# Patient Record
Sex: FEMALE | Race: WHITE | NOT HISPANIC OR LATINO | URBAN - METROPOLITAN AREA
[De-identification: names, ages, dates, MRNs, and addresses within clinical notes are randomized per-mention and may not be internally consistent; named-entity substitution may affect disease eponyms.]

---

## 2017-03-27 ENCOUNTER — APPOINTMENT (RX ONLY)
Dept: URBAN - METROPOLITAN AREA CLINIC 5 | Facility: CLINIC | Age: 27
Setting detail: DERMATOLOGY
End: 2017-03-27

## 2017-03-27 DIAGNOSIS — Z41.9 ENCOUNTER FOR PROCEDURE FOR PURPOSES OTHER THAN REMEDYING HEALTH STATE, UNSPECIFIED: ICD-10-CM

## 2017-03-27 PROCEDURE — ? CHEMICAL PEEL

## 2017-03-27 ASSESSMENT — LOCATION SIMPLE DESCRIPTION DERM: LOCATION SIMPLE: LEFT CHEEK

## 2017-03-27 ASSESSMENT — LOCATION DETAILED DESCRIPTION DERM: LOCATION DETAILED: LEFT INFERIOR CENTRAL MALAR CHEEK

## 2017-03-27 ASSESSMENT — LOCATION ZONE DERM: LOCATION ZONE: FACE

## 2017-03-27 NOTE — PROCEDURE: CHEMICAL PEEL
Consent: Prior to the procedure, written consent was obtained and risks were reviewed, including but not limited to: redness, peeling, blistering, pigmentary change, scarring, infection, and pain.
Detail Level: Zone
Treatment Number: 3
Chemical Peel: Trudy
Prep: The treated area was degreased with pre-peel cleanser, and vaseline was applied for protection of mucous membranes.
Post Peel Care: After the procedure, a post-peel cream was applied to the treated areas. Sun protection and post-care instructions were reviewed with the patient.
Post-Care Instructions: I reviewed with the patient in detail post-care instructions. Patient should avoid sun exposure and wear sun protection.

## 2017-04-03 ENCOUNTER — APPOINTMENT (RX ONLY)
Dept: URBAN - METROPOLITAN AREA CLINIC 29 | Facility: CLINIC | Age: 27
Setting detail: DERMATOLOGY
End: 2017-04-03

## 2017-04-03 DIAGNOSIS — Z41.9 ENCOUNTER FOR PROCEDURE FOR PURPOSES OTHER THAN REMEDYING HEALTH STATE, UNSPECIFIED: ICD-10-CM

## 2017-04-03 PROCEDURE — ? CHEMICAL PEEL

## 2017-04-03 ASSESSMENT — LOCATION ZONE DERM: LOCATION ZONE: FACE

## 2017-04-03 ASSESSMENT — LOCATION SIMPLE DESCRIPTION DERM: LOCATION SIMPLE: LEFT CHEEK

## 2017-04-03 ASSESSMENT — LOCATION DETAILED DESCRIPTION DERM: LOCATION DETAILED: LEFT CENTRAL MALAR CHEEK

## 2017-04-03 NOTE — PROCEDURE: CHEMICAL PEEL
Consent: Prior to the procedure, written consent was obtained and risks were reviewed, including but not limited to: redness, peeling, blistering, pigmentary change, scarring, infection, and pain.
Detail Level: Zone
Post-Care Instructions: I reviewed with the patient in detail post-care instructions. Patient should avoid sun exposure and wear sun protection.
Post Peel Care: After the procedure, a post-peel cream was applied to the treated areas. Sun protection and post-care instructions were reviewed with the patient.
Prep: The treated area was degreased with pre-peel cleanser, and vaseline was applied for protection of mucous membranes.
Treatment Number: 0
Chemical Peel: Trudy

## 2017-08-22 ENCOUNTER — APPOINTMENT (RX ONLY)
Dept: URBAN - METROPOLITAN AREA CLINIC 5 | Facility: CLINIC | Age: 27
Setting detail: DERMATOLOGY
End: 2017-08-22

## 2017-08-22 DIAGNOSIS — L90.5 SCAR CONDITIONS AND FIBROSIS OF SKIN: ICD-10-CM

## 2017-08-22 DIAGNOSIS — L81.0 POSTINFLAMMATORY HYPERPIGMENTATION: ICD-10-CM

## 2017-08-22 DIAGNOSIS — L70.8 OTHER ACNE: ICD-10-CM | Status: INADEQUATELY CONTROLLED

## 2017-08-22 PROCEDURE — 99213 OFFICE O/P EST LOW 20 MIN: CPT

## 2017-08-22 PROCEDURE — ? COUNSELING

## 2017-08-22 PROCEDURE — ? TREATMENT REGIMEN

## 2017-08-22 PROCEDURE — ? OTHER

## 2017-08-22 PROCEDURE — ? PRESCRIPTION

## 2017-08-22 RX ORDER — DAPSONE 75 MG/G
1 GEL TOPICAL QD
Qty: 1 | Refills: 2 | Status: ERX | COMMUNITY
Start: 2017-08-22

## 2017-08-22 RX ORDER — DAPSONE 50 MG/G
GEL TOPICAL QD
Qty: 1 | Refills: 4 | Status: ERX

## 2017-08-22 RX ORDER — ADAPALENE AND BENZOYL PEROXIDE 3; 25 MG/G; MG/G
GEL TOPICAL QHS
Qty: 1 | Refills: 4 | Status: ERX

## 2017-08-22 RX ORDER — DOXYCYCLINE HYCLATE 100 MG/1
1 CAPSULE, GELATIN COATED ORAL BID
Qty: 60 | Refills: 1 | Status: ERX | COMMUNITY
Start: 2017-08-22

## 2017-08-22 RX ADMIN — DAPSONE 1: 75 GEL TOPICAL at 00:00

## 2017-08-22 RX ADMIN — DOXYCYCLINE HYCLATE 1: 100 CAPSULE, GELATIN COATED ORAL at 22:33

## 2017-08-22 ASSESSMENT — LOCATION SIMPLE DESCRIPTION DERM
LOCATION SIMPLE: RIGHT CHEEK
LOCATION SIMPLE: LEFT CHEEK

## 2017-08-22 ASSESSMENT — LOCATION ZONE DERM: LOCATION ZONE: FACE

## 2017-08-22 ASSESSMENT — LOCATION DETAILED DESCRIPTION DERM
LOCATION DETAILED: LEFT INFERIOR CENTRAL MALAR CHEEK
LOCATION DETAILED: RIGHT INFERIOR CENTRAL MALAR CHEEK
LOCATION DETAILED: LEFT SUPERIOR MEDIAL BUCCAL CHEEK
LOCATION DETAILED: RIGHT SUPERIOR MEDIAL BUCCAL CHEEK

## 2017-08-22 NOTE — PROCEDURE: TREATMENT REGIMEN
Detail Level: Zone
Plan: Advised Patient to discuss starting spironolactone with ob/gyn
Continue Regimen: Epiduo forte nightly \\nAczone every morning
Initiate Treatment: Probiotic supplement daily \\nDoxycycline 100mg twice a day for 2-4 weeks, then decrease to once a day for 2-4 weeks \\nGoal doxy 50mg daily

## 2017-08-22 NOTE — PROCEDURE: MIPS QUALITY
Quality 431: Preventive Care And Screening: Unhealthy Alcohol Use - Screening: Patient screened for unhealthy alcohol use using a single question and scores 2 or greater episodes per year and brief intervention occurred
Detail Level: Simple

## 2017-08-22 NOTE — PROCEDURE: OTHER
Detail Level: Zone
Note Text (......Xxx Chief Complaint.): This diagnosis correlates with the
Other (Free Text): Family history of breast cancer (mother) \\nDenies HTN, DM

## 2018-02-12 ENCOUNTER — APPOINTMENT (RX ONLY)
Dept: URBAN - METROPOLITAN AREA CLINIC 5 | Facility: CLINIC | Age: 28
Setting detail: DERMATOLOGY
End: 2018-02-12

## 2018-02-12 DIAGNOSIS — Z41.9 ENCOUNTER FOR PROCEDURE FOR PURPOSES OTHER THAN REMEDYING HEALTH STATE, UNSPECIFIED: ICD-10-CM

## 2018-02-12 PROCEDURE — ? DEFER

## 2018-02-12 ASSESSMENT — LOCATION SIMPLE DESCRIPTION DERM: LOCATION SIMPLE: LEFT CHEEK

## 2018-02-12 ASSESSMENT — LOCATION ZONE DERM: LOCATION ZONE: FACE

## 2018-02-12 ASSESSMENT — LOCATION DETAILED DESCRIPTION DERM: LOCATION DETAILED: LEFT INFERIOR CENTRAL MALAR CHEEK

## 2018-02-26 ENCOUNTER — APPOINTMENT (RX ONLY)
Dept: URBAN - METROPOLITAN AREA CLINIC 5 | Facility: CLINIC | Age: 28
Setting detail: DERMATOLOGY
End: 2018-02-26

## 2018-02-26 DIAGNOSIS — Z41.9 ENCOUNTER FOR PROCEDURE FOR PURPOSES OTHER THAN REMEDYING HEALTH STATE, UNSPECIFIED: ICD-10-CM

## 2018-02-26 PROCEDURE — ? MICRONEEDLING

## 2018-02-26 NOTE — PROCEDURE: MICRONEEDLING
Location #2: cheeks
Consent: Written consent obtained, risks reviewed including but not limited to pain, scarring, infection and incomplete improvement.  Patient understands the procedure is cosmetic in nature and will require out of pocket payment.
Location #3: chin lip
Depth In Mm (Location #2): 1.7
Post-Care Instructions: After the procedure, take precautions agains sun exposure. Do not apply sunscreen for 12 hours after the procedure. Do not apply make-up for 12 hours after the procedure. Avoid alcohol based toners for 10-14 days. After 2-3 days patients can return to their regular skin regimen.
Depth In Mm (Location #4): 0.6
Treatment Number (Optional): 1
Location #1: forehead
Depth In Mm (Location #1): 1.5
Infusions (Optional): hyaluronic acid
Detail Level: Zone

## 2018-03-20 ENCOUNTER — APPOINTMENT (RX ONLY)
Dept: URBAN - METROPOLITAN AREA CLINIC 5 | Facility: CLINIC | Age: 28
Setting detail: DERMATOLOGY
End: 2018-03-20

## 2019-02-19 ENCOUNTER — APPOINTMENT (RX ONLY)
Dept: URBAN - METROPOLITAN AREA CLINIC 89 | Facility: CLINIC | Age: 29
Setting detail: DERMATOLOGY
End: 2019-02-19

## 2019-02-19 DIAGNOSIS — L70.8 OTHER ACNE: ICD-10-CM

## 2019-02-19 PROBLEM — L70.0 ACNE VULGARIS: Status: ACTIVE | Noted: 2019-02-19

## 2019-02-19 PROCEDURE — ? TREATMENT REGIMEN

## 2019-02-19 PROCEDURE — ? PRESCRIPTION

## 2019-02-19 PROCEDURE — 99203 OFFICE O/P NEW LOW 30 MIN: CPT

## 2019-02-19 PROCEDURE — ? COUNSELING

## 2019-02-19 RX ORDER — ADAPALENE AND BENZOYL PEROXIDE 3; 25 MG/G; MG/G
GEL TOPICAL QHS
Qty: 1 | Refills: 2 | Status: ERX | COMMUNITY
Start: 2019-02-19

## 2019-02-19 RX ORDER — SPIRONOLACTONE 50 MG/1
TABLET, FILM COATED ORAL QD
Qty: 30 | Refills: 1 | Status: ERX | COMMUNITY
Start: 2019-02-19

## 2019-02-19 RX ADMIN — ADAPALENE AND BENZOYL PEROXIDE: 3; 25 GEL TOPICAL at 00:00

## 2019-02-19 RX ADMIN — SPIRONOLACTONE: 50 TABLET, FILM COATED ORAL at 00:00

## 2019-02-19 ASSESSMENT — LOCATION DETAILED DESCRIPTION DERM: LOCATION DETAILED: LEFT MEDIAL FOREHEAD

## 2019-02-19 ASSESSMENT — LOCATION SIMPLE DESCRIPTION DERM: LOCATION SIMPLE: LEFT FOREHEAD

## 2019-02-19 ASSESSMENT — LOCATION ZONE DERM: LOCATION ZONE: FACE

## 2019-02-19 NOTE — PROCEDURE: TREATMENT REGIMEN
Otc Regimen: Gentle cleansers/moisturizers (Cerave/Cetaphil)
Plan: Pt denies any PHx of kidney problems\\nPt to notify office if she experienced any mood changes while in Spironolactone. Recommend patient to eat and drink well to avoid light headedness or dizziness
Detail Level: Zone
Continue Regimen: Clindamycin/benzoyl peroxide - aaa face QAM (prescribed by PCP)
Initiate Treatment: Spironolactone 50 mg - take one tablet by mouth once daily\\nEpiduo Forte - Apply pea sized amount to face every night to every other night as tolerated (Texan)

## 2019-03-26 ENCOUNTER — APPOINTMENT (RX ONLY)
Dept: URBAN - METROPOLITAN AREA CLINIC 89 | Facility: CLINIC | Age: 29
Setting detail: DERMATOLOGY
End: 2019-03-26

## 2019-03-26 DIAGNOSIS — L70.8 OTHER ACNE: ICD-10-CM | Status: INADEQUATELY CONTROLLED

## 2019-03-26 PROCEDURE — ? PRESCRIPTION

## 2019-03-26 PROCEDURE — 99214 OFFICE O/P EST MOD 30 MIN: CPT

## 2019-03-26 PROCEDURE — ? TREATMENT REGIMEN

## 2019-03-26 PROCEDURE — ? COUNSELING

## 2019-03-26 RX ORDER — SPIRONOLACTONE 100 MG/1
1 TABLET, FILM COATED ORAL QD
Qty: 90 | Refills: 0 | Status: ERX | COMMUNITY
Start: 2019-03-26

## 2019-03-26 RX ADMIN — SPIRONOLACTONE 1: 100 TABLET, FILM COATED ORAL at 21:00

## 2019-03-26 ASSESSMENT — LOCATION SIMPLE DESCRIPTION DERM: LOCATION SIMPLE: LEFT FOREHEAD

## 2019-03-26 ASSESSMENT — SEVERITY ASSESSMENT OVERALL AMONG ALL PATIENTS
IN YOUR EXPERIENCE, AMONG ALL PATIENTS YOU HAVE SEEN WITH THIS CONDITION, HOW SEVERE IS THIS PATIENT'S CONDITION?: MULTIPLE INFLAMMATORY LESIONS BUT NONINFLAMMATORY LESIONS PREDOMINATE

## 2019-03-26 ASSESSMENT — LOCATION ZONE DERM: LOCATION ZONE: FACE

## 2019-03-26 ASSESSMENT — LOCATION DETAILED DESCRIPTION DERM: LOCATION DETAILED: LEFT MEDIAL FOREHEAD

## 2019-03-26 NOTE — PROCEDURE: TREATMENT REGIMEN
Detail Level: Zone
Continue Regimen: Epiduo Forte - Apply pea sized amount to face every night to every other night as tolerated (Texan)
Otc Regimen: Gentle cleansers/moisturizers (Cerave/Cetaphil)
Plan: Pt denies any PHx of kidney problems\\nPt to notify office if she experienced any mood changes while in Spironolactone. Recommend patient to eat and drink well to avoid light headedness or dizziness
Modify Regimen: Spironolactone 50 mg to  spironolactone 100 mg tablet QD Sig: Take one tablet by mouth once daily

## 2019-07-01 ENCOUNTER — OFFICE VISIT (OUTPATIENT)
Dept: DERMATOLOGY | Facility: CLINIC | Age: 29
End: 2019-07-01
Payer: COMMERCIAL

## 2019-07-01 DIAGNOSIS — L70.0 ACNE VULGARIS: Primary | ICD-10-CM

## 2019-07-01 PROCEDURE — 99999 PR PBB SHADOW E&M-NEW PATIENT-LVL II: CPT | Mod: PBBFAC,,, | Performed by: PHYSICIAN ASSISTANT

## 2019-07-01 PROCEDURE — 99203 PR OFFICE/OUTPT VISIT, NEW, LEVL III, 30-44 MIN: ICD-10-PCS | Mod: S$GLB,,, | Performed by: PHYSICIAN ASSISTANT

## 2019-07-01 PROCEDURE — 99203 OFFICE O/P NEW LOW 30 MIN: CPT | Mod: S$GLB,,, | Performed by: PHYSICIAN ASSISTANT

## 2019-07-01 PROCEDURE — 99999 PR PBB SHADOW E&M-NEW PATIENT-LVL II: ICD-10-PCS | Mod: PBBFAC,,, | Performed by: PHYSICIAN ASSISTANT

## 2019-07-01 RX ORDER — SPIRONOLACTONE 50 MG/1
TABLET, FILM COATED ORAL
Qty: 60 TABLET | Refills: 3 | Status: SHIPPED | OUTPATIENT
Start: 2019-07-01 | End: 2019-11-19 | Stop reason: SDUPTHER

## 2019-07-01 RX ORDER — OXCARBAZEPINE 300 MG/1
TABLET, FILM COATED ORAL
Refills: 6 | Status: ON HOLD | COMMUNITY
Start: 2019-05-12 | End: 2022-09-24 | Stop reason: HOSPADM

## 2019-07-01 RX ORDER — LAMOTRIGINE 200 MG/1
TABLET ORAL
COMMUNITY
Start: 2019-06-24 | End: 2023-09-07 | Stop reason: DRUGHIGH

## 2019-07-01 RX ORDER — QUETIAPINE FUMARATE 100 MG/1
TABLET, FILM COATED ORAL
COMMUNITY
Start: 2019-06-24 | End: 2022-01-12

## 2019-07-01 RX ORDER — DAPSONE 75 MG/G
GEL TOPICAL
Qty: 60 G | Refills: 3 | Status: SHIPPED | OUTPATIENT
Start: 2019-07-01 | End: 2022-01-12

## 2019-07-01 RX ORDER — CLINDAMYCIN AND BENZOYL PEROXIDE 10; 50 MG/G; MG/G
GEL TOPICAL
COMMUNITY
Start: 2019-06-24 | End: 2022-01-12

## 2019-07-01 RX ORDER — ADAPALENE AND BENZOYL PEROXIDE 3; 25 MG/G; MG/G
GEL TOPICAL
Qty: 45 G | Refills: 3 | Status: SHIPPED | OUTPATIENT
Start: 2019-07-01 | End: 2022-01-12

## 2019-07-01 NOTE — PATIENT INSTRUCTIONS
Discussed benefits and risks of spironolactone therapy including but not limited to breakthrough bleeding, breast tenderness, and elevated potassium levels which may give symptoms of fatigue, palpitations, and nausea. Patient should limit potassium intake - avoid potassium supplements or salt substitutes, limit bananas and citrus fruits. Pregnancy must be avoided while taking spironolactone.      RETINOIDS           Your doctor has prescribed a topical retinoid for your skin. A retinoid is a vitamin A derived product used to treat a variety of skin conditions including acne, actinic keratoses (pre-skin cancers), uneven pigmentation from sun damage, fine lines and wrinkles, and enlarged pores.    How do they work?         Retinoids increase skin cell turn over from the normal 30 days to five or six days, minimizing clogged pores-the major factor in acne. Retinoids can also repair the DNA in cells damaged by the sun helping to even out skin pigmentation and clear pre-skin cancers. They can shrink oil glands and minimize the appearance of large pores. These effects can not be appreciated unless the medication is used on a consistent basis!    How do I use a retinoid?         After washing with a mild cleanser (Purpose, Aqua glycolic face cleanser, Cetaphil, Neutrogena deep cream cleanser), the skin should be moisturized with a non-retinol containing moisturizer such as Cerave PM. Then a thin layer of medication is applied to the forehead, nose cheeks, and chin (and around eyes if treating fine lines and wrinkles) at night. The amount of medication needed to cover the entire face should be no more than the size of a green pea. Irritation around the eyes can be treated with Vaseline at night.    What if my skin appears dry, red, and is peeling?          Retinoids do not cause dry skin but rather they cause the top layer of the skin the shed, giving an appearance of dry skin. In fact, new healthy skin cells are replacing  older, damaged cells on the surface. This usually occurs the first 2-4 weeks as the skin is adjusting to the medication. It is reasonable to use the medication every other night or even every 2 nights until your skin adjusts. You can use a MILD exfoliant to remove the peeling skin (Aveeno daily clarifying pads, Aqua glycolic face cream) and can apply a moisturizer throughout the day as needed. Retinoids come in a variety of strengths and vehicles and your doctor can find one best for you. If you cannot tolerate prescription strength retinoids, over the counter products with retinol may be beneficial. (Olay ProX wrinkle cream, BLAKE deep wrinkle cream, Green Cream at [employer])    Will my skin be more sensitive in the sun?           You will need to use sunscreen with SPF 30 daily. Retinoids will cause the outermost layer of the skin to be thinner and thus more sensitive to ultraviolet rays. However, remember that over time, retinoids actually make the skin thicker by enhancing collagen deposition which protects the skin from sun damage.    When will I see results?            If you are using a retinoid for acne, you should see improvement in 6-8 weeks. Do not be alarmed if you find that your acne gets worse before it gets better-KEEP USING THE MEDICATION- this is a normal response and your acne will improve if you can stick with it.           If you are using the medication for anti-aging and skin dyspigmentation, you may see results in 3 months, but most effects are not visible until 6 months. Retinoids are clinically proven to reverse signs of aging, but only if used on a CONSTISTENT BASIS!             Remember that retinoids should not be used if you are pregnant.           Discontinue use 1 week prior to waxing, as skin is more likely to tear.

## 2019-07-01 NOTE — PROGRESS NOTES
Subjective:       Patient ID:  Shaina Dietz is a 29 y.o. female who presents for   Chief Complaint   Patient presents with    Acne     Pt here today for refill on Rx Spironolactone, also concerned for redness on chest, neck, and cheeks.     Acne  - Initial  Affected locations: face  Duration: 4 years  Signs / symptoms: tender and redness  Aggravated by: menses  Treatments tried: Benzaclin biw, epiduo forte tiw qhs, spironolactone qd, washes bid.  Improvement on treatment: moderate    Was previously seeing a derm in [place], [place], who rx'd above meds.  Hysterectomy in Oct. 2018.    Review of Systems   HENT: Positive for headaches. Negative for nosebleeds.    Gastrointestinal: Negative for diarrhea and Sensitivity to oral antibiotics.   Genitourinary: Positive for irregular periods (s/p hysterectomy).   Musculoskeletal: Negative for arthralgias.   Skin: Positive for daily sunscreen use and activity-related sunscreen use. Negative for recent sunburn.   Neurological: Positive for headaches.   Psychiatric/Behavioral: Positive for depressed mood (bipolar II d/o).        Objective:    Physical Exam   Constitutional: She appears well-developed and well-nourished. No distress.   Neurological: She is alert and oriented to person, place, and time. She is not disoriented.   Psychiatric: She has a normal mood and affect.   Skin:   Areas Examined (abnormalities noted in diagram):   Head / Face Inspection Performed  Neck Inspection Performed  Chest / Axilla Inspection Performed  Back Inspection Performed  RUE Inspected  LUE Inspection Performed                   Diagram Legend     Erythematous scaling macule/papule c/w actinic keratosis       Vascular papule c/w angioma      Pigmented verrucoid papule/plaque c/w seborrheic keratosis      Yellow umbilicated papule c/w sebaceous hyperplasia      Irregularly shaped tan macule c/w lentigo     1-2 mm smooth white papules consistent with Milia      Movable subcutaneous cyst with  punctum c/w epidermal inclusion cyst      Subcutaneous movable cyst c/w pilar cyst      Firm pink to brown papule c/w dermatofibroma      Pedunculated fleshy papule(s) c/w skin tag(s)      Evenly pigmented macule c/w junctional nevus     Mildly variegated pigmented, slightly irregular-bordered macule c/w mildly atypical nevus      Flesh colored to evenly pigmented papule c/w intradermal nevus       Pink pearly papule/plaque c/w basal cell carcinoma      Erythematous hyperkeratotic cursted plaque c/w SCC      Surgical scar with no sign of skin cancer recurrence      Open and closed comedones      Inflammatory papules and pustules      Verrucoid papule consistent consistent with wart     Erythematous eczematous patches and plaques     Dystrophic onycholytic nail with subungual debris c/w onychomycosis     Umbilicated papule    Erythematous-base heme-crusted tan verrucoid plaque consistent with inflamed seborrheic keratosis     Erythematous Silvery Scaling Plaque c/w Psoriasis     See annotation    Assessment / Plan:        Acne vulgaris  -     spironolactone (ALDACTONE) 50 MG tablet; Take 2 po qday.  Dispense: 60 tablet; Refill: 3  -     ACZONE 7.5 % GlwP; AAA face qam/ prn for spot tx  Dispense: 60 g; Refill: 3  -     EPIDUO FORTE 0.3-2.5 % GlwP; AAA face qhs  Dispense: 45 g; Refill: 3    Discussed benefits and risks of therapy including but not limited to breakthrough bleeding, breast tenderness, and elevated potassium levels which may give symptoms of fatigue, palpitations, and nausea. Patient should limit potassium intake - avoid potassium supplements or salt substitutes, limit bananas and citrus fruits. Pregnancy must be avoided while taking spironolactone.    Discussed benefits and risks of topical retinoid. Brochure provided.         Follow up in about 3 months (around 10/1/2019).

## 2019-07-24 ENCOUNTER — OFFICE VISIT (OUTPATIENT)
Dept: OPTOMETRY | Facility: CLINIC | Age: 29
End: 2019-07-24
Payer: COMMERCIAL

## 2019-07-24 DIAGNOSIS — H52.13 MYOPIA, BILATERAL: Primary | ICD-10-CM

## 2019-07-24 DIAGNOSIS — Z46.0 FITTING AND ADJUSTMENT OF SPECTACLES AND CONTACT LENSES: Primary | ICD-10-CM

## 2019-07-24 PROCEDURE — 92004 COMPRE OPH EXAM NEW PT 1/>: CPT | Mod: S$GLB,,, | Performed by: OPTOMETRIST

## 2019-07-24 PROCEDURE — 92004 PR EYE EXAM, NEW PATIENT,COMPREHESV: ICD-10-PCS | Mod: S$GLB,,, | Performed by: OPTOMETRIST

## 2019-07-24 PROCEDURE — 92310 PR CONTACT LENS FITTING (NO CHANGE): ICD-10-PCS | Mod: CSM,,, | Performed by: OPTOMETRIST

## 2019-07-24 PROCEDURE — 92310 CONTACT LENS FITTING OU: CPT | Mod: CSM,,, | Performed by: OPTOMETRIST

## 2019-07-24 PROCEDURE — 99999 PR PBB SHADOW E&M-EST. PATIENT-LVL I: ICD-10-PCS | Mod: PBBFAC,,, | Performed by: OPTOMETRIST

## 2019-07-24 PROCEDURE — 99999 PR PBB SHADOW E&M-EST. PATIENT-LVL II: CPT | Mod: PBBFAC,,, | Performed by: OPTOMETRIST

## 2019-07-24 PROCEDURE — 99999 PR PBB SHADOW E&M-EST. PATIENT-LVL I: CPT | Mod: PBBFAC,,, | Performed by: OPTOMETRIST

## 2019-07-24 PROCEDURE — 99999 PR PBB SHADOW E&M-EST. PATIENT-LVL II: ICD-10-PCS | Mod: PBBFAC,,, | Performed by: OPTOMETRIST

## 2019-07-24 RX ORDER — PROPRANOLOL HYDROCHLORIDE 10 MG/1
TABLET ORAL
COMMUNITY
Start: 2019-07-03 | End: 2022-01-12

## 2019-08-01 ENCOUNTER — TELEPHONE (OUTPATIENT)
Dept: OPTOMETRY | Facility: CLINIC | Age: 29
End: 2019-08-01

## 2019-08-01 ENCOUNTER — PATIENT MESSAGE (OUTPATIENT)
Dept: OPTOMETRY | Facility: CLINIC | Age: 29
End: 2019-08-01

## 2019-08-01 NOTE — TELEPHONE ENCOUNTER
Final  Contact Lens Final Rx     Final Contact Lens Rx       Brand Base Curve Diameter Sphere Cylinder    Right Dailies Total 1 8.5 14.1 -2.00 Sphere    Left Dailies Total 1 8.5 14.1 -2.00 Sphere    Expiration Date:  8/1/2021    Replacement:  Daily    Wearing Schedule:  Daily wear

## 2019-08-02 ENCOUNTER — OFFICE VISIT (OUTPATIENT)
Dept: INTERNAL MEDICINE | Facility: CLINIC | Age: 29
End: 2019-08-02
Payer: COMMERCIAL

## 2019-08-02 VITALS
TEMPERATURE: 99 F | WEIGHT: 199.94 LBS | DIASTOLIC BLOOD PRESSURE: 76 MMHG | HEART RATE: 87 BPM | SYSTOLIC BLOOD PRESSURE: 126 MMHG | BODY MASS INDEX: 33.31 KG/M2 | HEIGHT: 65 IN | OXYGEN SATURATION: 99 %

## 2019-08-02 DIAGNOSIS — Z72.0 SMOKING TRYING TO QUIT: ICD-10-CM

## 2019-08-02 DIAGNOSIS — L70.9 ADULT ACNE: ICD-10-CM

## 2019-08-02 DIAGNOSIS — Z80.8 FAMILY HISTORY OF THYROID CANCER: ICD-10-CM

## 2019-08-02 DIAGNOSIS — Z00.00 ANNUAL PHYSICAL EXAM: Primary | ICD-10-CM

## 2019-08-02 DIAGNOSIS — F31.81 BIPOLAR 2 DISORDER: ICD-10-CM

## 2019-08-02 DIAGNOSIS — Z11.3 ROUTINE SCREENING FOR STI (SEXUALLY TRANSMITTED INFECTION): ICD-10-CM

## 2019-08-02 DIAGNOSIS — Z86.018 HISTORY OF UTERINE FIBROID: ICD-10-CM

## 2019-08-02 DIAGNOSIS — Z80.3 FAMILY HISTORY OF BREAST CANCER: ICD-10-CM

## 2019-08-02 PROCEDURE — 99385 PREV VISIT NEW AGE 18-39: CPT | Mod: S$GLB,,, | Performed by: INTERNAL MEDICINE

## 2019-08-02 PROCEDURE — 99406 BEHAV CHNG SMOKING 3-10 MIN: CPT | Mod: S$GLB,,, | Performed by: INTERNAL MEDICINE

## 2019-08-02 PROCEDURE — 99999 PR PBB SHADOW E&M-EST. PATIENT-LVL IV: CPT | Mod: PBBFAC,,, | Performed by: INTERNAL MEDICINE

## 2019-08-02 PROCEDURE — 99385 PR PREVENTIVE VISIT,NEW,18-39: ICD-10-PCS | Mod: S$GLB,,, | Performed by: INTERNAL MEDICINE

## 2019-08-02 PROCEDURE — 99999 PR PBB SHADOW E&M-EST. PATIENT-LVL IV: ICD-10-PCS | Mod: PBBFAC,,, | Performed by: INTERNAL MEDICINE

## 2019-08-02 PROCEDURE — 99406 PR TOBACCO USE CESSATION INTERMEDIATE 3-10 MINUTES: ICD-10-PCS | Mod: S$GLB,,, | Performed by: INTERNAL MEDICINE

## 2019-08-02 NOTE — Clinical Note
Angelica  - Ms Dietz is interested in working with you on healthy diet changes / exercise / weight loss

## 2019-08-02 NOTE — PROGRESS NOTES
Subjective:       Patient ID: Shaina Dietz is a 29 y.o. female.    Chief Complaint: Establish Care    HPI  30 y/o woman overall healthy, h/o bipolar 2 here to establish care.   Concerned about cholesterol, prediabetes.     Wants to work on consistent smoking cessation -- has been able to quit on & off.  Some days not smoking at all, some days 3-10 cigarettes. Primarily smoking in social situations. Has quit alcohol entirely, so smoking plays role in social interactions when out with friends.   Multiple quit attempts, sometimes days to weeks.     Acne - Taking spironolactone for acne, feels that it is helping. Following with dermatologist.    Bipolar type 2 - more prone to shanae, anxiety - Follows with Dr Delfino Licona in [place] for psychiatry - will continue to follow with him.   Recently established with a therapist.   Stable on current doses of medications  Takes seroquel about once a week if feeling manic or if missing a day of sleep    H/o hysterectomy for large symptomatic fibroids.   No oopherectomy or h/o endometriosis.    Not exercising. 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 Walks dog in AM and PM. Enjoys spin. Has membership at [employer].  Diet - generally pretty healthy for at least 2 meals/day  Often has trouble with sleep - takes seroquel or melatonin as needed    P aunt also with breast cancer  Mother recently diagnosed with some kind of heart problem requiring pacemaker    Review of Systems   Constitutional: Negative for activity change and unexpected weight change.   HENT: Negative for hearing loss, rhinorrhea and trouble swallowing.    Eyes: Negative for discharge and visual disturbance.   Respiratory: Negative for chest tightness and wheezing.    Cardiovascular: Negative for chest pain and palpitations.   Gastrointestinal: Negative for blood in stool, constipation, diarrhea and vomiting.   Endocrine: Negative for polydipsia and polyuria.   Genitourinary: Negative for difficulty  urinating, dysuria, hematuria and menstrual problem.   Musculoskeletal: Negative for arthralgias, joint swelling and neck pain.   Neurological: Negative for weakness and headaches.   Psychiatric/Behavioral: Negative for confusion and dysphoric mood.         Past Medical History:   Diagnosis Date    Acne     Bipolar 2 disorder     Hx of migraine headaches      Past Surgical History:   Procedure Laterality Date    HYSTERECTOMY  2018    For fibroids - benign. Removed Uterus, Cervix, and Fallopian tubes; not ovaries.    TONSILLECTOMY       Family History   Problem Relation Age of Onset    Alcohol abuse Mother     Cancer Mother         Breast Cancer (onset 55 y.o.)    Depression Mother     Hypertension Mother     Mental illness Mother     Heart disease Mother         heart problem requiring pacemaker    Cancer Father         Non-Hodgkins Lymphoma (onset 65 y.o.)    Stroke Father     Alcohol abuse Brother     Depression Brother     Learning disabilities Brother         ADHD    Mental illness Brother     Cancer Sister         Thyroid Cancer (onset 19 y.o.)    Depression Sister     Mental illness Sister     Cancer Paternal Grandmother         Metastatic Cervical Cancer    Early death Paternal Grandmother         Cervical Cancer    Depression Maternal Aunt     Early death Maternal Aunt         Drug Overdose    Mental illness Maternal Aunt     Cancer Paternal Aunt         breast cancer    Melanoma Neg Hx        Social History     Tobacco Use    Smoking status: Former Smoker     Packs/day: 0.25     Years: 10.00     Pack years: 2.50     Types: Cigarettes     Start date: 2009     Last attempt to quit: 2019     Years since quittin.1    Smokeless tobacco: Never Used   Substance Use Topics    Alcohol use: Not Currently     Alcohol/week: 0.0 oz     Frequency: Never     Binge frequency: Never     Comment: Former binge drinker (9 yrs). Sober 11 months (as of 2019)    Drug use:  "Never       Medications and allergies reviewed.     Objective:          Vitals:    08/02/19 1551   BP: 126/76   BP Location: Right arm   Patient Position: Sitting   Pulse: 87   Temp: 99.1 °F (37.3 °C)   TempSrc: Oral   SpO2: 99%   Weight: 90.7 kg (199 lb 15.3 oz)   Height: 5' 5" (1.651 m)     Body mass index is 33.27 kg/m².  Physical Exam   Constitutional: She is oriented to person, place, and time. She appears well-developed and well-nourished. No distress.   HENT:   Head: Normocephalic and atraumatic.   Nose: Nose normal.   Mouth/Throat: Oropharynx is clear and moist.   Eyes: Pupils are equal, round, and reactive to light. Conjunctivae and EOM are normal. No scleral icterus.   Neck: Neck supple. No thyromegaly present.   Cardiovascular: Normal rate, regular rhythm, normal heart sounds and intact distal pulses.   No murmur heard.  Pulmonary/Chest: Effort normal and breath sounds normal. No respiratory distress.   Abdominal: Soft. Bowel sounds are normal. She exhibits no distension. There is no tenderness.   Musculoskeletal: She exhibits no edema or tenderness.   Lymphadenopathy:     She has no cervical adenopathy.   Neurological: She is alert and oriented to person, place, and time. No cranial nerve deficit or sensory deficit. Gait normal.   Skin: Skin is warm and dry.   Psychiatric: She has a normal mood and affect. Her behavior is normal. Judgment and thought content normal.   Vitals reviewed.      No labs in system.    Assessment:       1. Annual physical exam    2. Routine screening for STI (sexually transmitted infection)    3. Smoking trying to quit    4. Bipolar 2 disorder    5. Adult acne    6. History of uterine fibroid    7. Family history of thyroid cancer    8. Family history of breast cancer        Plan:   Shaina was seen today for establish care.    Diagnoses and all orders for this visit:    Annual physical exam - stable, doing well. Reviewed chronic and preventive care. Counseled re: working on " regular aerobic exercise that she enjoys, healthy diet.   -     CBC auto differential; Future  -     Comprehensive metabolic panel; Future  -     TSH; Future  -     Lipid panel; Future  -     Hemoglobin A1c; Future    Routine screening for STI (sexually transmitted infection)  -     HIV 1/2 Ag/Ab (4th Gen); Future  -     RPR; Future  -     C. trachomatis/N. gonorrhoeae by AMP DNA; Future    Smoking trying to quit  Comments:  counseled re: strategies for quitting given social/psychological role for smoking being the  rather than primarily nicotine addiction  Discussed identifying the triggers in social situations that lead to wanting to smoke, consider finding a small activity or action that would serve a similar role or substitute  Orders:  -     Ambulatory referral to Smoking Cessation Program    Bipolar 2 disorder  Comments:  stable on current medications, continue with these and following with psychiatrist and therapist    Adult acne  Comments:  continue spironolactone, will monitor potassium and renal function on labs    History of uterine fibroid  Comments:  stable, doing well    Family history of thyroid cancer  Comments:  sister  Orders:  -     TSH; Future    Family history of breast cancer  Comments:  mother and paternal aunt    Health maintenance reviewed with patient.     Follow up in about 6 months (around 2/2/2020).    Neil Escalante MD  Internal Medicine  [facility]  8/2/2019

## 2019-08-02 NOTE — PATIENT INSTRUCTIONS
Ok to try the 7mg nicotine patch, or to use 1/2 of that patch if you feel that this is helpful and it doesn't make you feel sick.  May be more useful to use nicotine lozenges or gum in place of smoking cigarettes in social situations.  Try to find some other activity or ritual to substitute for smoking a cigarette!    Look at class schedule at [employer] - pick two classes that fit your schedule to do each week.

## 2019-08-04 PROBLEM — D21.9 FIBROIDS: Status: ACTIVE | Noted: 2019-08-04

## 2019-08-04 PROBLEM — Z86.018 HISTORY OF UTERINE FIBROID: Status: ACTIVE | Noted: 2019-08-04

## 2019-08-04 PROBLEM — F17.210 SMOKING 1/2 PACK A DAY OR LESS: Status: ACTIVE | Noted: 2019-08-04

## 2019-08-04 PROBLEM — Z87.891 FORMER SMOKER: Status: ACTIVE | Noted: 2019-08-04

## 2019-08-06 ENCOUNTER — TELEPHONE (OUTPATIENT)
Dept: INTERNAL MEDICINE | Facility: CLINIC | Age: 29
End: 2019-08-06

## 2019-08-06 NOTE — TELEPHONE ENCOUNTER
Called patient left message, intro self, referred by Dr. Escalante.   Request call back at direct number [phone].  Angelica Uribe RN HC

## 2019-08-06 NOTE — TELEPHONE ENCOUNTER
----- Message from Neil Escalante MD sent at 8/2/2019  5:29 PM CDT -----  Angelica  - Ms Dietz is interested in working with you on healthy diet changes / exercise / weight loss

## 2019-08-23 ENCOUNTER — LAB VISIT (OUTPATIENT)
Dept: LAB | Facility: HOSPITAL | Age: 29
End: 2019-08-23
Attending: INTERNAL MEDICINE
Payer: COMMERCIAL

## 2019-08-23 DIAGNOSIS — Z11.3 ROUTINE SCREENING FOR STI (SEXUALLY TRANSMITTED INFECTION): ICD-10-CM

## 2019-08-23 DIAGNOSIS — Z00.00 ANNUAL PHYSICAL EXAM: ICD-10-CM

## 2019-08-23 DIAGNOSIS — Z80.8 FAMILY HISTORY OF THYROID CANCER: ICD-10-CM

## 2019-08-23 LAB
ALBUMIN SERPL BCP-MCNC: 4.5 G/DL (ref 3.5–5.2)
ALP SERPL-CCNC: 77 U/L (ref 55–135)
ALT SERPL W/O P-5'-P-CCNC: 10 U/L (ref 10–44)
ANION GAP SERPL CALC-SCNC: 10 MMOL/L (ref 8–16)
AST SERPL-CCNC: 15 U/L (ref 10–40)
BASOPHILS # BLD AUTO: 0.03 K/UL (ref 0–0.2)
BASOPHILS NFR BLD: 0.4 % (ref 0–1.9)
BILIRUB SERPL-MCNC: 0.3 MG/DL (ref 0.1–1)
BUN SERPL-MCNC: 11 MG/DL (ref 6–20)
CALCIUM SERPL-MCNC: 10.1 MG/DL (ref 8.7–10.5)
CHLORIDE SERPL-SCNC: 105 MMOL/L (ref 95–110)
CHOLEST SERPL-MCNC: 192 MG/DL (ref 120–199)
CHOLEST/HDLC SERPL: 3.8 {RATIO} (ref 2–5)
CO2 SERPL-SCNC: 22 MMOL/L (ref 23–29)
CREAT SERPL-MCNC: 0.9 MG/DL (ref 0.5–1.4)
DIFFERENTIAL METHOD: ABNORMAL
EOSINOPHIL # BLD AUTO: 0 K/UL (ref 0–0.5)
EOSINOPHIL NFR BLD: 0.5 % (ref 0–8)
ERYTHROCYTE [DISTWIDTH] IN BLOOD BY AUTOMATED COUNT: 13.3 % (ref 11.5–14.5)
EST. GFR  (AFRICAN AMERICAN): >60 ML/MIN/1.73 M^2
EST. GFR  (NON AFRICAN AMERICAN): >60 ML/MIN/1.73 M^2
ESTIMATED AVG GLUCOSE: 94 MG/DL (ref 68–131)
GLUCOSE SERPL-MCNC: 102 MG/DL (ref 70–110)
HBA1C MFR BLD HPLC: 4.9 % (ref 4–5.6)
HCT VFR BLD AUTO: 42.1 % (ref 37–48.5)
HDLC SERPL-MCNC: 51 MG/DL (ref 40–75)
HDLC SERPL: 26.6 % (ref 20–50)
HGB BLD-MCNC: 15 G/DL (ref 12–16)
HIV 1+2 AB+HIV1 P24 AG SERPL QL IA: NEGATIVE
LDLC SERPL CALC-MCNC: 118 MG/DL (ref 63–159)
LYMPHOCYTES # BLD AUTO: 2 K/UL (ref 1–4.8)
LYMPHOCYTES NFR BLD: 25 % (ref 18–48)
MCH RBC QN AUTO: 31.3 PG (ref 27–31)
MCHC RBC AUTO-ENTMCNC: 35.6 G/DL (ref 32–36)
MCV RBC AUTO: 88 FL (ref 82–98)
MONOCYTES # BLD AUTO: 0.6 K/UL (ref 0.3–1)
MONOCYTES NFR BLD: 7.6 % (ref 4–15)
NEUTROPHILS # BLD AUTO: 5.4 K/UL (ref 1.8–7.7)
NEUTROPHILS NFR BLD: 66.5 % (ref 38–73)
NONHDLC SERPL-MCNC: 141 MG/DL
PLATELET # BLD AUTO: 359 K/UL (ref 150–350)
PMV BLD AUTO: 10.1 FL (ref 9.2–12.9)
POTASSIUM SERPL-SCNC: 4.3 MMOL/L (ref 3.5–5.1)
PROT SERPL-MCNC: 7.9 G/DL (ref 6–8.4)
RBC # BLD AUTO: 4.79 M/UL (ref 4–5.4)
RPR SER QL: NORMAL
SODIUM SERPL-SCNC: 137 MMOL/L (ref 136–145)
TRIGL SERPL-MCNC: 115 MG/DL (ref 30–150)
TSH SERPL DL<=0.005 MIU/L-ACNC: 1.1 UIU/ML (ref 0.4–4)
WBC # BLD AUTO: 8.12 K/UL (ref 3.9–12.7)

## 2019-08-23 PROCEDURE — 80061 LIPID PANEL: CPT

## 2019-08-23 PROCEDURE — 86703 HIV-1/HIV-2 1 RESULT ANTBDY: CPT

## 2019-08-23 PROCEDURE — 36415 COLL VENOUS BLD VENIPUNCTURE: CPT

## 2019-08-23 PROCEDURE — 80053 COMPREHEN METABOLIC PANEL: CPT

## 2019-08-23 PROCEDURE — 83036 HEMOGLOBIN GLYCOSYLATED A1C: CPT

## 2019-08-23 PROCEDURE — 84443 ASSAY THYROID STIM HORMONE: CPT

## 2019-08-23 PROCEDURE — 86592 SYPHILIS TEST NON-TREP QUAL: CPT

## 2019-08-23 PROCEDURE — 85025 COMPLETE CBC W/AUTO DIFF WBC: CPT

## 2019-08-26 DIAGNOSIS — D75.839 THROMBOCYTOSIS: Primary | ICD-10-CM

## 2019-09-03 ENCOUNTER — CLINICAL SUPPORT (OUTPATIENT)
Dept: SMOKING CESSATION | Facility: CLINIC | Age: 29
End: 2019-09-03
Payer: COMMERCIAL

## 2019-09-03 VITALS
DIASTOLIC BLOOD PRESSURE: 72 MMHG | WEIGHT: 204.56 LBS | BODY MASS INDEX: 34.05 KG/M2 | SYSTOLIC BLOOD PRESSURE: 124 MMHG | HEART RATE: 80 BPM

## 2019-09-03 DIAGNOSIS — F17.200 SMOKER: Primary | ICD-10-CM

## 2019-09-03 PROCEDURE — 99404 PREV MED CNSL INDIV APPRX 60: CPT | Mod: S$GLB,,, | Performed by: INTERNAL MEDICINE

## 2019-09-03 PROCEDURE — 99404 PR PREVENT COUNSEL,INDIV,60 MIN: ICD-10-PCS | Mod: S$GLB,,, | Performed by: INTERNAL MEDICINE

## 2019-09-04 NOTE — PROGRESS NOTES
9/3/19   See Smoking Cessation Smart Form    Additional Interventions:  · Recommended patient participate in Smoking Cessation Group .  · Discussed triggers and planning for quit date.  · Given patient education handouts from [employer] Tool Kit #3  · Educated patient about and gave patient education handouts from  [employer] Drug Information on: NRT, Wellbutrin, Chantix  · Provided phone number to reach [facility] CTTS (Certified Tobacco Treatment Specialist) for future assistance and numbers to 24/7 Quit lines.

## 2019-09-05 RX ORDER — IBUPROFEN 200 MG
1 TABLET ORAL DAILY
Qty: 28 PATCH | Refills: 0 | Status: SHIPPED | OUTPATIENT
Start: 2019-09-05 | End: 2020-05-23

## 2019-09-05 RX ORDER — DIPHENHYDRAMINE HCL 25 MG
4 CAPSULE ORAL
Qty: 220 EACH | Refills: 0 | Status: SHIPPED | OUTPATIENT
Start: 2019-09-05 | End: 2020-05-23

## 2019-09-11 ENCOUNTER — TELEPHONE (OUTPATIENT)
Dept: SMOKING CESSATION | Facility: CLINIC | Age: 29
End: 2019-09-11

## 2019-09-12 NOTE — TELEPHONE ENCOUNTER
9/11/19   8:40 pm    Telephone call to patient to determine when she could schedule next appointment.  She said she was going to call when she knew her schedule from work but had not called yet.

## 2019-09-17 ENCOUNTER — TELEPHONE (OUTPATIENT)
Dept: SMOKING CESSATION | Facility: CLINIC | Age: 29
End: 2019-09-17

## 2019-09-17 NOTE — TELEPHONE ENCOUNTER
9/1719   1:15 pm    Telephone call to patient to follow up on progress quitting smoking.  Left voice mail #2 for return call.

## 2019-09-18 ENCOUNTER — TELEPHONE (OUTPATIENT)
Dept: SMOKING CESSATION | Facility: CLINIC | Age: 29
End: 2019-09-18

## 2019-09-18 NOTE — TELEPHONE ENCOUNTER
4:50 pm    Returned patient's call and left a message of when pre-group sessions were to see if the next time would meet her schedule.

## 2019-10-21 ENCOUNTER — TELEPHONE (OUTPATIENT)
Dept: SMOKING CESSATION | Facility: CLINIC | Age: 29
End: 2019-10-21

## 2019-10-21 NOTE — TELEPHONE ENCOUNTER
10/21/19   2:45 pm    Telephone call to patient to follow up on progress quitting smoking.  Left voice mail  for return call.

## 2019-10-21 NOTE — TELEPHONE ENCOUNTER
10/21/19    3:34 pm    Telephone call to patient to follow up on smoking cessation.  Patient reported she quit not long after she met with me, about 9/10/19.  She said she started to wear the patches but they bothered her so she used only the nicotine gum and was able to quit.  She said the first 4 days were really hard but she is doing much better now.  We scheduled a follow up appointment.

## 2019-11-19 DIAGNOSIS — L70.0 ACNE VULGARIS: ICD-10-CM

## 2019-11-19 RX ORDER — SPIRONOLACTONE 50 MG/1
TABLET, FILM COATED ORAL
Qty: 60 TABLET | Refills: 3 | Status: SHIPPED | OUTPATIENT
Start: 2019-11-19 | End: 2020-02-28 | Stop reason: SDUPTHER

## 2019-12-03 ENCOUNTER — OFFICE VISIT (OUTPATIENT)
Dept: OBSTETRICS AND GYNECOLOGY | Facility: CLINIC | Age: 29
End: 2019-12-03
Payer: COMMERCIAL

## 2019-12-03 VITALS
BODY MASS INDEX: 32.99 KG/M2 | DIASTOLIC BLOOD PRESSURE: 76 MMHG | WEIGHT: 198 LBS | HEIGHT: 65 IN | SYSTOLIC BLOOD PRESSURE: 120 MMHG

## 2019-12-03 DIAGNOSIS — Z01.419 ENCOUNTER FOR GYNECOLOGICAL EXAMINATION (GENERAL) (ROUTINE) WITHOUT ABNORMAL FINDINGS: Primary | ICD-10-CM

## 2019-12-03 PROCEDURE — 88175 CYTOPATH C/V AUTO FLUID REDO: CPT

## 2019-12-03 PROCEDURE — 99385 PREV VISIT NEW AGE 18-39: CPT | Mod: S$GLB,,, | Performed by: OBSTETRICS & GYNECOLOGY

## 2019-12-03 PROCEDURE — 99999 PR PBB SHADOW E&M-EST. PATIENT-LVL III: CPT | Mod: PBBFAC,,, | Performed by: OBSTETRICS & GYNECOLOGY

## 2019-12-03 PROCEDURE — 99385 PR PREVENTIVE VISIT,NEW,18-39: ICD-10-PCS | Mod: S$GLB,,, | Performed by: OBSTETRICS & GYNECOLOGY

## 2019-12-03 PROCEDURE — 99999 PR PBB SHADOW E&M-EST. PATIENT-LVL III: ICD-10-PCS | Mod: PBBFAC,,, | Performed by: OBSTETRICS & GYNECOLOGY

## 2019-12-08 NOTE — PROGRESS NOTES
Subjective:       Patient ID: Shaina Dietz is a 29 y.o. female.    Chief Complaint:  New patient      History of Present Illness  29 year old here to est and have annual.  Patient moved to [place] and is a .  Patient overall reports to be doing well.  Patient had a hysterectomy for uterine fibroids and bleeding.  No current bleeding or pelvic pain.  Discussed family history of cancer and dis interested in screening and meeting with the genetic counselor.  Patient filled out cancer screening.  Discussed baseline pap.  Taking aldactone for ance.    GYN & OB History  No LMP recorded. Patient has had a hysterectomy.   Date of Last Pap: No result found    OB History   No data available       Past Medical History:   Diagnosis Date    Acne     Bipolar 2 disorder     Hx of migraine headaches        Past Surgical History:   Procedure Laterality Date    HYSTERECTOMY  October 2018    For fibroids - benign. Removed Uterus, Cervix, and Fallopian tubes; not ovaries.    TONSILLECTOMY  2011       Review of Systems  Review of Systems   Constitutional: Negative for fatigue.   Respiratory: Negative for shortness of breath.    Cardiovascular: Negative for chest pain.   Gastrointestinal: Negative for abdominal pain, constipation, diarrhea and nausea.   Endocrine: Negative for heat intolerance.   Genitourinary: Negative for dyspareunia, dysuria, menstrual problem, pelvic pain and vaginal bleeding.   Musculoskeletal: Negative for back pain.   Skin: Negative for rash.   Neurological: Negative for headaches.   Hematological: Negative for adenopathy.   Psychiatric/Behavioral: Negative for dysphoric mood. The patient is not nervous/anxious.         Objective:   Physical Exam:   Constitutional: She is oriented to person, place, and time. She appears well-developed and well-nourished.      Neck: No thyromegaly present.     Pulmonary/Chest: Effort normal. Right breast exhibits no mass, no nipple discharge, no skin change,  no tenderness and no bleeding. Left breast exhibits no mass, no nipple discharge, no skin change, no tenderness and no bleeding.        Abdominal: Soft. Normal appearance and bowel sounds are normal. She exhibits no distension and no mass. There is no tenderness. There is no rebound and no guarding.     Genitourinary: Vagina normal. Pelvic exam was performed with patient supine. There is no rash, tenderness, lesion or injury on the right labia. There is no rash, tenderness, lesion or injury on the left labia. Uterus is absent. Right adnexum displays no mass, no tenderness and no fullness. Left adnexum displays no mass, no tenderness and no fullness. No erythema, tenderness, rectocele, cystocele or unspecified prolapse of vaginal walls in the vagina. No signs of injury around the vagina. No vaginal discharge found. Cervix exhibits absence.           Musculoskeletal: Normal range of motion and moves all extremeties.      Lymphadenopathy:     She has no axillary adenopathy.        Right: No supraclavicular adenopathy present.        Left: No supraclavicular adenopathy present.    Neurological: She is alert and oriented to person, place, and time.    Skin: Skin is warm and dry.    Psychiatric: She has a normal mood and affect. Her behavior is normal. Judgment normal.        Assessment/ Plan:     Encounter for gynecological examination (general) (routine) without abnormal findings  -     Liquid-Based Pap Smear, Screening    will follow up with genetics   Discussed lifestyle and vitamins      Follow up in about 1 year (around 12/3/2020).    Patient was counseled today on A.C.S. Pap guidelines and recommendations for yearly pelvic exams, mammograms and monthly self breast exams; to see her PCP for other health maintenance.

## 2019-12-17 LAB
FINAL PATHOLOGIC DIAGNOSIS: NORMAL
Lab: NORMAL

## 2020-02-28 DIAGNOSIS — L70.0 ACNE VULGARIS: ICD-10-CM

## 2020-02-28 RX ORDER — SPIRONOLACTONE 50 MG/1
TABLET, FILM COATED ORAL
Qty: 60 TABLET | Refills: 3 | Status: SHIPPED | OUTPATIENT
Start: 2020-02-28 | End: 2020-07-20

## 2020-04-21 DIAGNOSIS — Z01.84 ANTIBODY RESPONSE EXAMINATION: ICD-10-CM

## 2020-04-22 ENCOUNTER — PATIENT MESSAGE (OUTPATIENT)
Dept: DERMATOLOGY | Facility: CLINIC | Age: 30
End: 2020-04-22

## 2020-04-24 ENCOUNTER — OFFICE VISIT (OUTPATIENT)
Dept: DERMATOLOGY | Facility: CLINIC | Age: 30
End: 2020-04-24
Payer: COMMERCIAL

## 2020-04-24 DIAGNOSIS — H01.113 ALLERGIC CONTACT DERMATITIS OF EYELIDS OF BOTH EYES: ICD-10-CM

## 2020-04-24 DIAGNOSIS — H01.116 ALLERGIC CONTACT DERMATITIS OF EYELIDS OF BOTH EYES: ICD-10-CM

## 2020-04-24 DIAGNOSIS — L71.0 PERIORIFICIAL DERMATITIS: Primary | ICD-10-CM

## 2020-04-24 PROCEDURE — 99213 PR OFFICE/OUTPT VISIT, EST, LEVL III, 20-29 MIN: ICD-10-PCS | Mod: 95,,, | Performed by: DERMATOLOGY

## 2020-04-24 PROCEDURE — 99213 OFFICE O/P EST LOW 20 MIN: CPT | Mod: 95,,, | Performed by: DERMATOLOGY

## 2020-04-24 RX ORDER — PIMECROLIMUS 10 MG/G
CREAM TOPICAL
Qty: 60 G | Refills: 1 | Status: SHIPPED | OUTPATIENT
Start: 2020-04-24 | End: 2021-11-24

## 2020-04-24 RX ORDER — DOXYCYCLINE HYCLATE 100 MG
TABLET ORAL
Qty: 30 TABLET | Refills: 0 | Status: SHIPPED | OUTPATIENT
Start: 2020-04-24 | End: 2021-11-24

## 2020-04-24 NOTE — PATIENT INSTRUCTIONS
Summer Sun Protection      The [facility] would like to remind you of the importance of sun protection all year round and particularly during the summer when the suns rays are the strongest. It has been proven that both acute and chronic sun exposure damages our cells and leads to skin cancer. Beyond skin cancer, the sun causes 90% of the symptoms of pre-mature skin aging, including wrinkles, lentigines (brown spots), and thin, easily bruised skin. Proper sun protection can help prevent these unwanted conditions.    Many patients report that the dont go in the sun. It has been shown that the average person receives 18 hours of incidental sun exposure per week during activities such as walking through parking lots, driving, or sitting next to windows. This accumulates to several bad sunburns per year!    In choosing sunscreen, you want one that protects against both UVA and UVB rays. It is recommended that you use one of SPF 30 or higher. It is important to apply the sunscreen about 20 minutes prior to sun exposure. Most sunscreens are chemical sunscreens and a reaction must take place in the skin so that they are effective. If they are applied and then you are immediately exposed to the sun or start sweating, this reaction has not had time to take place and you are therefore unprotected. Sunscreen needs to be reapplied every 2 hours if you are participating in water sports or sweating. We recommend Elta MD or Neutrogena Ultra Sheer Dry Touch SPF 55 for daily use; however there are many options and it is most important for you to find one that you will use on a consistent basis.    If you have sensitive skin, you may do best with a sunscreen that contains only physical blockers such as titanium dioxide or zinc oxide. These are typically thicker and harder to apply, however they afford very good protection. Neutrogena Sensitive Skin, Blue Lizard Sensitive Skin (pink top) or Neutrogena Pure  and Free are popular ones.     Aside from sunscreen, clothes with UV protection, wide brimmed hats, and sunglasses are other means of sun protection that we recommend.                        [facility] - DERMATOLOGY  [address]  [place] [address]  Dept: [phone]  Dept Fax: [phone]                                                                               XEROSIS (DRY SKIN)        1. Definition    Xerosis is the term for dry skin.  We all have a natural oil coating over our skin produced by the skin oil glands.  If this oil is removed, the skin becomes dry which can lead to cracking, which can lead to inflammation.  Xerosis is usually a long-term problem that recurs often, especially in the winter.    2. Cause     Long hot baths or showers can remove our natural oil and lead to xerosis.  One should never take more than one bath or shower a day and for no longer than ten minutes.   Use of harsh soaps such as Zest, Dial, and Ivory can worsen and cause xerosis.   Cold winter weather worsens xerosis because the amount of moisture contained in cold air is much less than the amount of moisture in warm air.    3. Treatment     Treatment is intended to restore the natural oil to your skin.  Keep the skin lubricated.     Do not take more than one bath or shower a day.  Use lukewarm water, not hot.  Hot water dries out the skin.     Use a gentle moisturizing soap such as Cetaphil soap, Oil of Olay, Dove, Basis, Ivory moisture care, Restoraderm cleanser.     When toweling dry, dont rub.  Blot the skin so there is still some water left on the skin.  You should apply a moisturizing cream to all of the skin such as Cerave cream, Cetaphil cream, Restoraderm or Eucerin Original Formula cream.   Alpha hydroxyacid lotions, i.e., AmLactin, also work very well for preventing dry skin, but may burn when used on inflamed or reddened skin.     If you like to swim during the  winter months, you should not use soap when getting out of the pool.  When you have finished swimming, rinse off the chlorine with cool to warm water.  If this will be the only shower of the day, then you may use Cetaphil or another mild soap to cleanse your skin.  After the shower, apply a moisturizing cream to all of the skin as above.        [address] [place] [address]/ [phone] FAX/ [email or web address]

## 2020-04-24 NOTE — PROGRESS NOTES
Subjective:       Patient ID:  Shaina Dietz is a 29 y.o. female who presents for   Chief Complaint   Patient presents with    Rash     HPI  The patient location is: home  The chief complaint leading to consultation is: rash  Visit type: audiovisual  Total time spent with patient: 10 min  Each patient to whom he or she provides medical services by telemedicine is:  (1) informed of the relationship between the physician and patient and the respective role of any other health care provider with respect to management of the patient; and (2) notified that he or she may decline to receive medical services by telemedicine and may withdraw from such care at any time.    Notes: Pt presents today via video visit during Covid-19 pandemic for rash on face and eyelids which happened in the last week. Rash is dry, red, peeling, and irritated. Worsened by heat and stress and new facial products. Rash has improved since stopping the new products and going back to Cetaphil.    Of note, pt has acne, most recently treated with spironolactone, aczone, and epiduo forte (although she hasn't been using these much lately). Has a hx of depression and bipolar 2 disorder.    Review of Systems   Constitutional: Negative for fever and chills.   Skin: Positive for itching and rash.        Objective:    Physical Exam   Constitutional: She appears well-developed and well-nourished. No distress.   Neurological: She is alert and oriented to person, place, and time. She is not disoriented.   Psychiatric: She has a normal mood and affect.   Skin:   Areas Examined (abnormalities noted in diagram):   Head / Face Inspection Performed  Neck Inspection Performed                          Diagram Legend     Erythematous scaling macule/papule c/w actinic keratosis       Vascular papule c/w angioma      Pigmented verrucoid papule/plaque c/w seborrheic keratosis      Yellow umbilicated papule c/w sebaceous hyperplasia      Irregularly shaped tan macule c/w  lentigo     1-2 mm smooth white papules consistent with Milia      Movable subcutaneous cyst with punctum c/w epidermal inclusion cyst      Subcutaneous movable cyst c/w pilar cyst      Firm pink to brown papule c/w dermatofibroma      Pedunculated fleshy papule(s) c/w skin tag(s)      Evenly pigmented macule c/w junctional nevus     Mildly variegated pigmented, slightly irregular-bordered macule c/w mildly atypical nevus      Flesh colored to evenly pigmented papule c/w intradermal nevus       Pink pearly papule/plaque c/w basal cell carcinoma      Erythematous hyperkeratotic cursted plaque c/w SCC      Surgical scar with no sign of skin cancer recurrence      Open and closed comedones      Inflammatory papules and pustules      Verrucoid papule consistent consistent with wart     Erythematous eczematous patches and plaques     Dystrophic onycholytic nail with subungual debris c/w onychomycosis     Umbilicated papule    Erythematous-base heme-crusted tan verrucoid plaque consistent with inflamed seborrheic keratosis     Erythematous Silvery Scaling Plaque c/w Psoriasis     See annotation      Assessment / Plan:        Periorificial dermatitis  -     doxycycline (VIBRA-TABS) 100 MG tablet; Take 1 tab po daily with food and large glass of water. Remain upright x 1 hr after taking.  Dispense: 30 tablet; Refill: 0  -     ELIDEL 1 % cream; AAA BID  Dispense: 60 g; Refill: 1    Allergic contact dermatitis of eyelids of both eyes  -     ELIDEL 1 % cream; AAA BID  Dispense: 60 g; Refill: 1    Pt has stopped the new facial products. Will continue Cetaphil.    Discussed possibility of patch testing in future.    rtc prn

## 2020-05-18 ENCOUNTER — LAB VISIT (OUTPATIENT)
Dept: LAB | Facility: HOSPITAL | Age: 30
End: 2020-05-18
Attending: INTERNAL MEDICINE
Payer: COMMERCIAL

## 2020-05-18 DIAGNOSIS — Z01.84 ANTIBODY RESPONSE EXAMINATION: ICD-10-CM

## 2020-05-18 LAB — SARS-COV-2 IGG SERPLBLD QL IA.RAPID: NEGATIVE

## 2020-05-18 PROCEDURE — 36415 COLL VENOUS BLD VENIPUNCTURE: CPT

## 2020-05-18 PROCEDURE — 86769 SARS-COV-2 COVID-19 ANTIBODY: CPT

## 2020-05-22 ENCOUNTER — TELEPHONE (OUTPATIENT)
Dept: DERMATOLOGY | Facility: CLINIC | Age: 30
End: 2020-05-22

## 2020-05-22 NOTE — TELEPHONE ENCOUNTER
----- Message from Peggy Ventura sent at 5/22/2020  2:04 PM CDT -----  Contact: Self [phone]  Pt states she is returning a phone call please call back to discuss

## 2020-05-22 NOTE — TELEPHONE ENCOUNTER
----- Message from Abi Mehta sent at 5/22/2020  1:57 PM CDT -----  Contact: Pt request via [facility]  Message     Appointment Request From: Shaina Dietz    With Provider: Any    Preferred Date Range: 5/25/2020 - 5/26/2020    Preferred Times: Any time    Reason for visit: Bump (possible boil) on inner thigh near groin area. Very painful, hot to touch, and redness around area.    Comments:  Assessment for infection and treatment options.

## 2020-05-23 ENCOUNTER — OFFICE VISIT (OUTPATIENT)
Dept: URGENT CARE | Facility: CLINIC | Age: 30
End: 2020-05-23
Payer: COMMERCIAL

## 2020-05-23 VITALS
WEIGHT: 200 LBS | BODY MASS INDEX: 33.32 KG/M2 | TEMPERATURE: 98 F | HEART RATE: 99 BPM | HEIGHT: 65 IN | SYSTOLIC BLOOD PRESSURE: 128 MMHG | RESPIRATION RATE: 19 BRPM | OXYGEN SATURATION: 98 % | DIASTOLIC BLOOD PRESSURE: 80 MMHG

## 2020-05-23 DIAGNOSIS — L03.116 CELLULITIS OF LEFT LOWER EXTREMITY: Primary | ICD-10-CM

## 2020-05-23 PROCEDURE — 99214 PR OFFICE/OUTPT VISIT, EST, LEVL IV, 30-39 MIN: ICD-10-PCS | Mod: S$GLB,,, | Performed by: NURSE PRACTITIONER

## 2020-05-23 PROCEDURE — 99214 OFFICE O/P EST MOD 30 MIN: CPT | Mod: S$GLB,,, | Performed by: NURSE PRACTITIONER

## 2020-05-23 RX ORDER — MUPIROCIN 20 MG/G
OINTMENT TOPICAL
Qty: 22 G | Refills: 0 | Status: SHIPPED | OUTPATIENT
Start: 2020-05-23 | End: 2021-11-24

## 2020-05-23 RX ORDER — CEPHALEXIN 500 MG/1
500 CAPSULE ORAL 4 TIMES DAILY
Qty: 20 CAPSULE | Refills: 0 | Status: SHIPPED | OUTPATIENT
Start: 2020-05-23 | End: 2020-05-28

## 2020-05-23 NOTE — PROGRESS NOTES
"Subjective:       Patient ID: Shaina Dietz is a 29 y.o. female.    Vitals:  height is 5' 5" (1.651 m) and weight is 90.7 kg (200 lb). Her tympanic temperature is 97.8 °F (36.6 °C). Her blood pressure is 128/80 and her pulse is 99. Her respiration is 19 and oxygen saturation is 98%.     Chief Complaint: Cyst (left side of groin)    Redness to left inner upper thigh x4 days with surrounding erythema. TTP, warm to touch and hard with induration. No drainage.     Cyst   This is a new problem. The current episode started in the past 7 days (4 days ). The problem occurs constantly. The problem has been gradually worsening. Pertinent negatives include no abdominal pain, anorexia, arthralgias, change in bowel habit, chest pain, chills, congestion, coughing, diaphoresis, fatigue, fever, headaches, joint swelling, myalgias, nausea, neck pain, numbness, rash, sore throat, swollen glands, urinary symptoms, vertigo, visual change, vomiting or weakness. The symptoms are aggravated by standing and walking (movement, touching). She has tried heat and acetaminophen (neosproin) for the symptoms. The treatment provided mild relief.       Constitution: Negative for chills, sweating, fatigue and fever.   HENT: Negative for congestion and sore throat.    Neck: Negative for neck pain and painful lymph nodes.   Cardiovascular: Negative for chest pain and leg swelling.   Eyes: Negative for double vision and blurred vision.   Respiratory: Negative for cough and shortness of breath.    Gastrointestinal: Negative for abdominal pain, nausea, vomiting and diarrhea.   Genitourinary: Negative for dysuria, frequency, urgency and history of kidney stones.   Musculoskeletal: Negative for joint pain, joint swelling, muscle cramps and muscle ache.   Skin: Positive for color change, skin thickening/induration and erythema. Negative for pale, rash, bruising and abscess.   Allergic/Immunologic: Negative for seasonal allergies.   Neurological: Negative " for dizziness, history of vertigo, light-headedness, passing out, headaches and numbness.   Hematologic/Lymphatic: Negative for swollen lymph nodes.   Psychiatric/Behavioral: Negative for nervous/anxious, sleep disturbance and depression. The patient is not nervous/anxious.        Objective:      Physical Exam   Constitutional: She is oriented to person, place, and time. She appears well-developed and well-nourished. She is cooperative.  Non-toxic appearance. She does not appear ill. No distress.   HENT:   Head: Normocephalic and atraumatic.   Right Ear: Hearing, tympanic membrane, external ear and ear canal normal.   Left Ear: Hearing, tympanic membrane, external ear and ear canal normal.   Nose: Nose normal. No mucosal edema, rhinorrhea or nasal deformity. No epistaxis. Right sinus exhibits no maxillary sinus tenderness and no frontal sinus tenderness. Left sinus exhibits no maxillary sinus tenderness and no frontal sinus tenderness.   Mouth/Throat: Uvula is midline, oropharynx is clear and moist and mucous membranes are normal. No trismus in the jaw. Normal dentition. No uvula swelling. No posterior oropharyngeal erythema.   Eyes: Conjunctivae and lids are normal. Right eye exhibits no discharge. Left eye exhibits no discharge. No scleral icterus.   Neck: Trachea normal, normal range of motion, full passive range of motion without pain and phonation normal. Neck supple.   Cardiovascular: Normal rate, regular rhythm, normal heart sounds, intact distal pulses and normal pulses.   Pulmonary/Chest: Effort normal and breath sounds normal. No respiratory distress.   Abdominal: Soft. Normal appearance and bowel sounds are normal. She exhibits no distension, no pulsatile midline mass and no mass. There is no tenderness.   Musculoskeletal: Normal range of motion. She exhibits no edema or deformity.   Neurological: She is alert and oriented to person, place, and time. She exhibits normal muscle tone. Coordination normal.    Skin: Skin is warm, dry, intact, not diaphoretic and not pale. Lesions:  erythema       Psychiatric: She has a normal mood and affect. Her speech is normal and behavior is normal. Judgment and thought content normal. Cognition and memory are normal.   Nursing note and vitals reviewed.        Assessment:       1. Cellulitis of left lower extremity        Plan:     cellulitic area indurated, nothing to drain at this time, pt instructed on warm compresses/sitz baths and to rtc if comes to a head and needs to be drained in clinic. Take tylenol/motrin PRN pain.     Cellulitis of left lower extremity    Other orders  -     cephALEXin (KEFLEX) 500 MG capsule; Take 1 capsule (500 mg total) by mouth 4 (four) times daily. for 5 days  Dispense: 20 capsule; Refill: 0  -     mupirocin (BACTROBAN) 2 % ointment; Apply to affected area once daily  Dispense: 22 g; Refill: 0

## 2020-05-23 NOTE — PATIENT INSTRUCTIONS
Discharge Instructions for Cellulitis  You have been diagnosed with cellulitis. This is an infection in the deepest layer of the skin. In some cases, the infection also affects the muscle. Cellulitis is caused by bacteria. The bacteria can enter the body through broken skin. This can happen with a cut, scratch, animal bite, or an insect bite that has been scratched. You may have been treated in the hospital with antibiotics and fluids. You will likely be given a prescription for antibiotics to take at home. This sheet will help you take care of yourself at home.  Home care  When you are home:  · Take the prescribed antibiotic medicine you are given as directed until it is gone. Take it even if you feel better. It treats the infection and stops it from returning. Not taking all the medicine can make future infections hard to treat.  · Keep the infected area clean.  · When possible, raise the infected area above the level of your heart. This helps keep swelling down.  · Talk with your healthcare provider if you are in pain. Ask what kind of over-the-counter medicine you can take for pain.  · Apply clean bandages as advised.  · Take your temperature once a day for a week.  · Wash your hands often to prevent spreading the infection.  In the future, wash your hands before and after you touch cuts, scratches, or bandages. This will help prevent infection.   When to call your healthcare provider  Call your healthcare provider immediately if you have any of the following:  · Difficulty or pain when moving the joints above or below the infected area  · Discharge or pus draining from the area  · Fever of 100.4°F (38°C) or higher, or as directed by your healthcare provider  · Pain that gets worse in or around the infected   · Redness that gets worse in or around the infected area, particularly if the area of redness expands to a wider area  · Shaking chills  · Swelling of the infected area  · Vomiting   Date Last Reviewed:  8/1/2016  © 5749-1970 [employer], [employer]. [address] [place] [address], [place] [address]. All rights reserved. This information is not intended as a substitute for professional medical care. Always follow your healthcare professional's instructions.      Please arrange follow up with your primary medical clinic as soon as possible. You must understand that you've received an Urgent Care treatment only and that you may be released before all of your medical problems are known or treated. You, the patient, will arrange for follow up as instructed. If your symptoms worsen or fail to improve you should go to the Emergency Room.

## 2020-07-20 ENCOUNTER — OFFICE VISIT (OUTPATIENT)
Dept: DERMATOLOGY | Facility: CLINIC | Age: 30
End: 2020-07-20
Payer: COMMERCIAL

## 2020-07-20 DIAGNOSIS — L70.0 ACNE VULGARIS: Primary | ICD-10-CM

## 2020-07-20 PROCEDURE — 99213 OFFICE O/P EST LOW 20 MIN: CPT | Mod: 95,,, | Performed by: PHYSICIAN ASSISTANT

## 2020-07-20 PROCEDURE — 99213 PR OFFICE/OUTPT VISIT, EST, LEVL III, 20-29 MIN: ICD-10-PCS | Mod: 95,,, | Performed by: PHYSICIAN ASSISTANT

## 2020-07-20 RX ORDER — ADAPALENE GEL USP, 0.3% 3 MG/G
GEL TOPICAL
Qty: 45 G | Refills: 2 | Status: SHIPPED | OUTPATIENT
Start: 2020-07-20 | End: 2022-01-12

## 2020-07-20 RX ORDER — SPIRONOLACTONE 100 MG/1
100 TABLET, FILM COATED ORAL DAILY
Qty: 90 TABLET | Refills: 4 | Status: SHIPPED | OUTPATIENT
Start: 2020-07-20 | End: 2022-01-12

## 2020-07-20 NOTE — PATIENT INSTRUCTIONS
Please contact Dr. Bhatt at [facility] in [facility] if you are interested in cosmetic consult for chemical peels, etc.    You should be receiving a call from [facility] regarding your retinoid (adapalene). If you do not hear from them within 24 hrs, give them a call at [phone].     RETINOIDS           Your doctor has prescribed a topical retinoid for your skin. A retinoid is a vitamin A derived product used to treat a variety of skin conditions including acne, actinic keratoses (pre-skin cancers), uneven pigmentation from sun damage, fine lines and wrinkles, and enlarged pores.    How do they work?         Retinoids increase skin cell turn over from the normal 30 days to five or six days, minimizing clogged pores-the major factor in acne. Retinoids can also repair the DNA in cells damaged by the sun helping to even out skin pigmentation and clear pre-skin cancers. They can shrink oil glands and minimize the appearance of large pores. These effects can not be appreciated unless the medication is used on a consistent basis!    How do I use a retinoid?         After washing with a mild cleanser (Purpose, Aqua glycolic face cleanser, Cetaphil, Neutrogena deep cream cleanser), the skin should be moisturized with a non-retinol containing moisturizer such as Cerave PM. Then a thin layer of medication is applied to the forehead, nose cheeks, and chin (and around eyes if treating fine lines and wrinkles) at night. The amount of medication needed to cover the entire face should be no more than the size of a green pea. Irritation around the eyes can be treated with Vaseline at night.    What if my skin appears dry, red, and is peeling?          Retinoids do not cause dry skin but rather they cause the top layer of the skin the shed, giving an appearance of dry skin. In fact, new healthy skin cells are replacing older, damaged cells on the surface. This usually occurs the first 2-4 weeks as the skin is adjusting to  the medication. It is reasonable to use the medication every other night or even every 2 nights until your skin adjusts. You can use a MILD exfoliant to remove the peeling skin (Aveeno daily clarifying pads, Aqua glycolic face cream) and can apply a moisturizer throughout the day as needed. Retinoids come in a variety of strengths and vehicles and your doctor can find one best for you. If you cannot tolerate prescription strength retinoids, over the counter products with retinol may be beneficial. (Olay ProX wrinkle cream, BLAKE deep wrinkle cream, Green Cream at [email or web address])    Will my skin be more sensitive in the sun?           You will need to use sunscreen with SPF 30 daily. Retinoids will cause the outermost layer of the skin to be thinner and thus more sensitive to ultraviolet rays. However, remember that over time, retinoids actually make the skin thicker by enhancing collagen deposition which protects the skin from sun damage.    When will I see results?            If you are using a retinoid for acne, you should see improvement in 6-8 weeks. Do not be alarmed if you find that your acne gets worse before it gets better-KEEP USING THE MEDICATION- this is a normal response and your acne will improve if you can stick with it.           If you are using the medication for anti-aging and skin dyspigmentation, you may see results in 3 months, but most effects are not visible until 6 months. Retinoids are clinically proven to reverse signs of aging, but only if used on a CONSTISTENT BASIS!             Remember that retinoids should not be used if you are pregnant.           Discontinue use 1 week prior to waxing, as skin is more likely to tear.

## 2020-07-20 NOTE — PROGRESS NOTES
Subjective:       Patient ID:  Shaina Dietz is a 30 y.o. female who presents for acne f/u.    The patient location is: home  The chief complaint leading to consultation is: acne f/u and med refill    Visit type: audiovisual    Face to Face time with patient: 14  16 minutes of total time spent on the encounter, which includes face to face time and non-face to face time preparing to see the patient (eg, review of tests), Obtaining and/or reviewing separately obtained history, Documenting clinical information in the electronic or other health record, Independently interpreting results (not separately reported) and communicating results to the patient/family/caregiver, or Care coordination (not separately reported).     Each patient to whom he or she provides medical services by telemedicine is:  (1) informed of the relationship between the physician and patient and the respective role of any other health care provider with respect to management of the patient; and (2) notified that he or she may decline to receive medical services by telemedicine and may withdraw from such care at any time.    Notes:   Acne - Follow-up  Symptom course: stable (last seen 7/2019)  Currently using: spironolactone 100mg qd (ran out of rx 3 wks ago), only uses epiduo forte occasionally 2/2 irritation.  Affected locations: face  Signs / symptoms: asymptomatic (dark spots)        Review of Systems   HENT: Negative for nosebleeds.    Gastrointestinal: Negative for diarrhea and Sensitivity to oral antibiotics.   Genitourinary: Positive for irregular periods (s/p hysterectomy).   Musculoskeletal: Negative for arthralgias.   Skin: Positive for daily sunscreen use and activity-related sunscreen use. Negative for recent sunburn.   Psychiatric/Behavioral: Positive for depressed mood (bipolar II d/o).        Objective:    Physical Exam   Constitutional: She appears well-developed and well-nourished. No distress.   Neurological: She is alert and  oriented to person, place, and time. She is not disoriented.   Psychiatric: She has a normal mood and affect.   Skin:   Areas Examined (abnormalities noted in diagram):   Head / Face Inspection Performed  Neck Inspection Performed              Diagram Legend     Erythematous scaling macule/papule c/w actinic keratosis       Vascular papule c/w angioma      Pigmented verrucoid papule/plaque c/w seborrheic keratosis      Yellow umbilicated papule c/w sebaceous hyperplasia      Irregularly shaped tan macule c/w lentigo     1-2 mm smooth white papules consistent with Milia      Movable subcutaneous cyst with punctum c/w epidermal inclusion cyst      Subcutaneous movable cyst c/w pilar cyst      Firm pink to brown papule c/w dermatofibroma      Pedunculated fleshy papule(s) c/w skin tag(s)      Evenly pigmented macule c/w junctional nevus     Mildly variegated pigmented, slightly irregular-bordered macule c/w mildly atypical nevus      Flesh colored to evenly pigmented papule c/w intradermal nevus       Pink pearly papule/plaque c/w basal cell carcinoma      Erythematous hyperkeratotic cursted plaque c/w SCC      Surgical scar with no sign of skin cancer recurrence      Open and closed comedones      Inflammatory papules and pustules      Verrucoid papule consistent consistent with wart     Erythematous eczematous patches and plaques     Dystrophic onycholytic nail with subungual debris c/w onychomycosis     Umbilicated papule    Erythematous-base heme-crusted tan verrucoid plaque consistent with inflamed seborrheic keratosis     Erythematous Silvery Scaling Plaque c/w Psoriasis     See annotation          Assessment / Plan:      Acne vulgaris - EUP  -     adapalene 0.3 % gel; Apply a pea-sized amt to face qohs x 2 wks then increase to qhs as tolerated.  Dispense: 45 g; Refill: 2  -     spironolactone (ALDACTONE) 100 MG tablet; Take 1 tablet (100 mg total) by mouth once daily.  Dispense: 90 tablet; Refill: 4    Please  contact Dr. Bhatt at [facility] in [facility] if you are interested in cosmetic consult for chemical peels, etc.    You should be receiving a call from [facility] regarding your retinoid (adapalene). If you do not hear from them within 24 hrs, give them a call at [phone].          Follow up if symptoms worsen or fail to improve.

## 2020-10-05 ENCOUNTER — PATIENT MESSAGE (OUTPATIENT)
Dept: ADMINISTRATIVE | Facility: HOSPITAL | Age: 30
End: 2020-10-05

## 2021-01-04 ENCOUNTER — PATIENT MESSAGE (OUTPATIENT)
Dept: ADMINISTRATIVE | Facility: HOSPITAL | Age: 31
End: 2021-01-04

## 2021-01-05 ENCOUNTER — PATIENT MESSAGE (OUTPATIENT)
Dept: OBSTETRICS AND GYNECOLOGY | Facility: CLINIC | Age: 31
End: 2021-01-05

## 2021-01-08 ENCOUNTER — OFFICE VISIT (OUTPATIENT)
Dept: OBSTETRICS AND GYNECOLOGY | Facility: CLINIC | Age: 31
End: 2021-01-08
Attending: OBSTETRICS & GYNECOLOGY
Payer: COMMERCIAL

## 2021-01-08 VITALS
SYSTOLIC BLOOD PRESSURE: 128 MMHG | HEIGHT: 65 IN | DIASTOLIC BLOOD PRESSURE: 80 MMHG | BODY MASS INDEX: 34.73 KG/M2 | WEIGHT: 208.44 LBS

## 2021-01-08 DIAGNOSIS — Z01.419 ENCOUNTER FOR GYNECOLOGICAL EXAMINATION (GENERAL) (ROUTINE) WITHOUT ABNORMAL FINDINGS: ICD-10-CM

## 2021-01-08 DIAGNOSIS — Z11.51 SCREENING FOR HUMAN PAPILLOMAVIRUS: ICD-10-CM

## 2021-01-08 DIAGNOSIS — Z01.419 WELL FEMALE EXAM WITH ROUTINE GYNECOLOGICAL EXAM: Primary | ICD-10-CM

## 2021-01-08 PROCEDURE — 99395 PR PREVENTIVE VISIT,EST,18-39: ICD-10-PCS | Mod: S$GLB,,, | Performed by: OBSTETRICS & GYNECOLOGY

## 2021-01-08 PROCEDURE — 87624 HPV HI-RISK TYP POOLED RSLT: CPT

## 2021-01-08 PROCEDURE — 88175 CYTOPATH C/V AUTO FLUID REDO: CPT

## 2021-01-08 PROCEDURE — 99999 PR PBB SHADOW E&M-EST. PATIENT-LVL III: ICD-10-PCS | Mod: PBBFAC,,, | Performed by: OBSTETRICS & GYNECOLOGY

## 2021-01-08 PROCEDURE — 99395 PREV VISIT EST AGE 18-39: CPT | Mod: S$GLB,,, | Performed by: OBSTETRICS & GYNECOLOGY

## 2021-01-08 PROCEDURE — 99999 PR PBB SHADOW E&M-EST. PATIENT-LVL III: CPT | Mod: PBBFAC,,, | Performed by: OBSTETRICS & GYNECOLOGY

## 2021-01-11 ENCOUNTER — CLINICAL SUPPORT (OUTPATIENT)
Dept: URGENT CARE | Facility: CLINIC | Age: 31
End: 2021-01-11
Payer: COMMERCIAL

## 2021-01-11 VITALS — TEMPERATURE: 99 F | OXYGEN SATURATION: 97 % | HEART RATE: 69 BPM

## 2021-01-11 DIAGNOSIS — Z11.59 SCREENING FOR VIRAL DISEASE: Primary | ICD-10-CM

## 2021-01-11 LAB
CTP QC/QA: YES
SARS-COV-2 RDRP RESP QL NAA+PROBE: NEGATIVE

## 2021-01-11 PROCEDURE — 99211 OFF/OP EST MAY X REQ PHY/QHP: CPT | Mod: S$GLB,,, | Performed by: NURSE PRACTITIONER

## 2021-01-11 PROCEDURE — U0002: ICD-10-PCS | Mod: QW,S$GLB,, | Performed by: NURSE PRACTITIONER

## 2021-01-11 PROCEDURE — U0002 COVID-19 LAB TEST NON-CDC: HCPCS | Mod: QW,S$GLB,, | Performed by: NURSE PRACTITIONER

## 2021-01-11 PROCEDURE — 99211 PR OFFICE/OUTPT VISIT, EST, LEVL I: ICD-10-PCS | Mod: S$GLB,,, | Performed by: NURSE PRACTITIONER

## 2021-01-15 ENCOUNTER — OFFICE VISIT (OUTPATIENT)
Dept: URGENT CARE | Facility: CLINIC | Age: 31
End: 2021-01-15
Payer: COMMERCIAL

## 2021-01-15 VITALS
RESPIRATION RATE: 16 BRPM | BODY MASS INDEX: 34.66 KG/M2 | OXYGEN SATURATION: 100 % | HEIGHT: 65 IN | DIASTOLIC BLOOD PRESSURE: 82 MMHG | TEMPERATURE: 99 F | HEART RATE: 77 BPM | SYSTOLIC BLOOD PRESSURE: 128 MMHG | WEIGHT: 208 LBS

## 2021-01-15 DIAGNOSIS — T78.40XA ALLERGIC REACTION, INITIAL ENCOUNTER: Primary | ICD-10-CM

## 2021-01-15 PROCEDURE — 99214 PR OFFICE/OUTPT VISIT, EST, LEVL IV, 30-39 MIN: ICD-10-PCS | Mod: S$GLB,,, | Performed by: FAMILY MEDICINE

## 2021-01-15 PROCEDURE — 99214 OFFICE O/P EST MOD 30 MIN: CPT | Mod: S$GLB,,, | Performed by: FAMILY MEDICINE

## 2021-01-15 RX ORDER — HYDROXYZINE HYDROCHLORIDE 25 MG/1
25 TABLET, FILM COATED ORAL EVERY 6 HOURS PRN
Qty: 20 TABLET | Refills: 0 | Status: SHIPPED | OUTPATIENT
Start: 2021-01-15 | End: 2022-01-12

## 2021-01-15 RX ORDER — MOMETASONE FUROATE 1 MG/G
CREAM TOPICAL
Qty: 45 G | Refills: 0 | Status: SHIPPED | OUTPATIENT
Start: 2021-01-15 | End: 2022-01-12

## 2021-01-19 LAB
HPV HR 12 DNA SPEC QL NAA+PROBE: NEGATIVE
HPV16 AG SPEC QL: NEGATIVE
HPV18 DNA SPEC QL NAA+PROBE: NEGATIVE

## 2021-02-05 LAB
FINAL PATHOLOGIC DIAGNOSIS: NORMAL
Lab: NORMAL

## 2021-04-05 ENCOUNTER — PATIENT MESSAGE (OUTPATIENT)
Dept: ADMINISTRATIVE | Facility: HOSPITAL | Age: 31
End: 2021-04-05

## 2021-08-02 ENCOUNTER — TELEPHONE (OUTPATIENT)
Dept: OPTOMETRY | Facility: CLINIC | Age: 31
End: 2021-08-02

## 2021-08-02 ENCOUNTER — PATIENT MESSAGE (OUTPATIENT)
Dept: OPTOMETRY | Facility: CLINIC | Age: 31
End: 2021-08-02

## 2021-11-05 ENCOUNTER — TELEPHONE (OUTPATIENT)
Dept: NEUROLOGY | Facility: CLINIC | Age: 31
End: 2021-11-05
Payer: COMMERCIAL

## 2021-11-24 ENCOUNTER — OFFICE VISIT (OUTPATIENT)
Dept: URGENT CARE | Facility: CLINIC | Age: 31
End: 2021-11-24
Payer: COMMERCIAL

## 2021-11-24 VITALS
HEART RATE: 84 BPM | RESPIRATION RATE: 15 BRPM | SYSTOLIC BLOOD PRESSURE: 121 MMHG | TEMPERATURE: 99 F | WEIGHT: 220 LBS | OXYGEN SATURATION: 98 % | BODY MASS INDEX: 36.65 KG/M2 | HEIGHT: 65 IN | DIASTOLIC BLOOD PRESSURE: 82 MMHG

## 2021-11-24 DIAGNOSIS — R09.81 NASAL CONGESTION: ICD-10-CM

## 2021-11-24 DIAGNOSIS — J06.9 VIRAL URI WITH COUGH: Primary | ICD-10-CM

## 2021-11-24 DIAGNOSIS — J02.9 SORE THROAT: ICD-10-CM

## 2021-11-24 DIAGNOSIS — R05.9 COUGH: ICD-10-CM

## 2021-11-24 LAB
CTP QC/QA: YES
SARS-COV-2 RDRP RESP QL NAA+PROBE: NEGATIVE

## 2021-11-24 PROCEDURE — 99213 OFFICE O/P EST LOW 20 MIN: CPT | Mod: S$GLB,,, | Performed by: NURSE PRACTITIONER

## 2021-11-24 PROCEDURE — 99213 PR OFFICE/OUTPT VISIT, EST, LEVL III, 20-29 MIN: ICD-10-PCS | Mod: S$GLB,,, | Performed by: NURSE PRACTITIONER

## 2021-11-24 PROCEDURE — U0002 COVID-19 LAB TEST NON-CDC: HCPCS | Mod: QW,S$GLB,, | Performed by: NURSE PRACTITIONER

## 2021-11-24 PROCEDURE — U0002: ICD-10-PCS | Mod: QW,S$GLB,, | Performed by: NURSE PRACTITIONER

## 2021-11-24 RX ORDER — AZELASTINE 1 MG/ML
1 SPRAY, METERED NASAL 2 TIMES DAILY
Qty: 30 ML | Refills: 0 | Status: SHIPPED | OUTPATIENT
Start: 2021-11-24 | End: 2022-01-12

## 2021-11-24 RX ORDER — FLUTICASONE PROPIONATE 50 MCG
2 SPRAY, SUSPENSION (ML) NASAL DAILY
Qty: 9.9 ML | Refills: 0 | Status: SHIPPED | OUTPATIENT
Start: 2021-11-24 | End: 2023-09-07

## 2022-01-12 ENCOUNTER — OFFICE VISIT (OUTPATIENT)
Dept: NEUROLOGY | Facility: CLINIC | Age: 32
End: 2022-01-12
Payer: COMMERCIAL

## 2022-01-12 DIAGNOSIS — R56.9 SEIZURE-LIKE ACTIVITY: ICD-10-CM

## 2022-01-12 DIAGNOSIS — R41.3 OTHER AMNESIA: ICD-10-CM

## 2022-01-12 DIAGNOSIS — R56.9 CONVULSIONS, UNSPECIFIED CONVULSION TYPE: ICD-10-CM

## 2022-01-12 DIAGNOSIS — R29.818 TRANSIENT NEUROLOGICAL SYMPTOMS: ICD-10-CM

## 2022-01-12 PROBLEM — F17.210 SMOKING 1/2 PACK A DAY OR LESS: Status: RESOLVED | Noted: 2019-08-04 | Resolved: 2022-01-12

## 2022-01-12 PROCEDURE — 1160F RVW MEDS BY RX/DR IN RCRD: CPT | Mod: CPTII,95,, | Performed by: PSYCHIATRY & NEUROLOGY

## 2022-01-12 PROCEDURE — 99204 OFFICE O/P NEW MOD 45 MIN: CPT | Mod: 95,,, | Performed by: PSYCHIATRY & NEUROLOGY

## 2022-01-12 PROCEDURE — 99204 PR OFFICE/OUTPT VISIT, NEW, LEVL IV, 45-59 MIN: ICD-10-PCS | Mod: 95,,, | Performed by: PSYCHIATRY & NEUROLOGY

## 2022-01-12 PROCEDURE — 1160F PR REVIEW ALL MEDS BY PRESCRIBER/CLIN PHARMACIST DOCUMENTED: ICD-10-PCS | Mod: CPTII,95,, | Performed by: PSYCHIATRY & NEUROLOGY

## 2022-01-12 PROCEDURE — 1159F MED LIST DOCD IN RCRD: CPT | Mod: CPTII,95,, | Performed by: PSYCHIATRY & NEUROLOGY

## 2022-01-12 PROCEDURE — 1159F PR MEDICATION LIST DOCUMENTED IN MEDICAL RECORD: ICD-10-PCS | Mod: CPTII,95,, | Performed by: PSYCHIATRY & NEUROLOGY

## 2022-01-12 RX ORDER — OLANZAPINE 5 MG/1
5 TABLET ORAL NIGHTLY
COMMUNITY
Start: 2021-12-30 | End: 2023-09-07

## 2022-01-12 RX ORDER — CLONAZEPAM 1 MG/1
.5-1 TABLET ORAL DAILY PRN
COMMUNITY
Start: 2021-12-31 | End: 2023-09-07

## 2022-01-12 NOTE — PROGRESS NOTES
[facility] NEUROLOGY  [facility], [place]    Date: 1/12/22  Patient Name: Shaina Dietz   MRN: [id number]   PCP: Neil Escalante (Inactive)  Referring Provider: Rimma Salter MD     TELEMEDICINE ENCOUNTER  Patient verbally confirmed being in the [facility] at the time of the encounter    Chief Complaint: seizure-like activity   Subjective:   Patient seen in consultation at the request of Rimma Salter MD for the evaluation of seizure like activity. A copy of this note will be sent to the referring physician.      HPI:   Ms. Shaina Dietz is a 31 y.o. female presenting to discuss events that she is concerned may be with seizure activity.  She shares that starting approximately 13 months ago, she began to experience sudden onset 1-2 minute episodes with melissa vu, feeling of impending doom, alterations in hearing (described as feeling like her hearing is underwater), cognitive fog, word-finding difficulty or inability to speak.  Her partner who is present for today's encounter as that the patient sometimes seems to clinch her hands, can not seem to interact with others, and finds that the patient is very fatigued for several hours after the events.  Patient states that she sometimes feels as if she is going to pass out during the episodes but has never lost consciousness.  She endorses full recollection of each event with no bowel/bladder incontinence, rhythmic shaking, tongue biting, or uncontrolled movements.    She estimates an average frequency of once per week.  She has had days for more than 1 of the episodes occurred.  She has experienced several weeks symptom-free periods.  Has not had an episode in over 2 weeks.    No personal history of seizures, neurological infections, or significant head trauma.  Family history includes father with epilepsy that began in his youth controlled with medication.    Currently on a multidrug regimen for bipolar 2 disorder.  These medications  include Trileptal 900 mg q.h.s., Lamictal 400 mg q.h.s., and clonazepam 0.5-1 mg as needed.  Endorses good compliance with these medications.  No recent drug changes though she notes Trileptal was mildly lowered earlier this year due to memory complaints; memory improved with doses she is currently at.    May have had neurological imaging as a child but denies imaging in adulthood.    PAST MEDICAL HISTORY:  Past Medical History:   Diagnosis Date    Acne     Bipolar 2 disorder     Hx of migraine headaches        PAST SURGICAL HISTORY:  Past Surgical History:   Procedure Laterality Date    HYSTERECTOMY  October 2018    For fibroids - benign. Removed Uterus, Cervix, and Fallopian tubes; not ovaries.    TONSILLECTOMY  2011       CURRENT MEDS:  Current Outpatient Medications   Medication Sig Dispense Refill    clonazePAM (KLONOPIN) 1 MG tablet Take 0.5-1 mg by mouth daily as needed.      fluticasone propionate (FLONASE) 50 mcg/actuation nasal spray 2 sprays (100 mcg total) by Each Nostril route once daily. 9.9 mL 0    lamoTRIgine (LAMICTAL) 200 MG tablet       OLANZapine (ZYPREXA) 5 MG tablet Take 5 mg by mouth nightly.      OXcarbazepine (TRILEPTAL) 300 MG Tab TK 4 TS PO QHS  6     No current facility-administered medications for this visit.       ALLERGIES:  Review of patient's allergies indicates:  No Known Allergies    FAMILY HISTORY:  Family History   Problem Relation Age of Onset    Alcohol abuse Mother     Cancer Mother         Breast Cancer (onset 55 y.o.)    Depression Mother     Hypertension Mother     Mental illness Mother     Heart disease Mother         heart problem requiring pacemaker    Cancer Father         Non-Hodgkins Lymphoma (onset 65 y.o.)    Stroke Father     Alcohol abuse Brother     Depression Brother     Learning disabilities Brother         ADHD    Mental illness Brother     Cancer Sister         Thyroid Cancer (onset 19 y.o.)    Depression Sister     Mental illness  Sister     Cancer Paternal Grandmother         Metastatic Cervical Cancer    Early death Paternal Grandmother         Cervical Cancer    Depression Maternal Aunt     Early death Maternal Aunt         Drug Overdose    Mental illness Maternal Aunt     Cancer Paternal Aunt         breast cancer    Melanoma Neg Hx        SOCIAL HISTORY:  Social History     Tobacco Use    Smoking status: Former Smoker     Packs/day: 0.25     Years: 10.00     Pack years: 2.50     Types: Cigarettes     Start date: 2009     Quit date: 9/10/2019     Years since quittin.3    Smokeless tobacco: Never Used   Substance Use Topics    Alcohol use: Not Currently     Alcohol/week: 0.0 standard drinks     Comment: Former binge drinker (9 yrs). Sober 11 months (as of 2019)    Drug use: Never       Review of Systems:  Gen: no fever, no chills, no generalized feeling of weakness   HEENT: no double vision, no blurred vision, no eye pain, no eye exudates. no nasal congestion,   no traumatic injury of head, no neck pain, no neck stiffness. no photophobia or phonophobia at this time. ?    Heart: no chest pain, no SOB    Lungs: no SOB, no cough    MSK: no weakness of legs, intact ROM    ABD: no abd pain, no N/V/D/C, no difficulty with defecation.    Extremities: No leg pain, no edema.       Objective:   No vitals due to nature of telemedicine encounter.     GENERAL: Pleasant, well-appearing in no acute distress.     HEENT: Head normorcephalic, atraumatic.     PULMONARY: Breathing comfortably on room air.    NEURO:   Mental status: Awake, alert, and oriented to person, place, time, and situation. Speech clear, fluent.     Cranial nerves:     III/IV/VI: EOMI. No nystagmus.     VII: Facial expressions full and symmetric.     VIII: Hearing intact to voice.     XII: Tongue protrusion midline.     Motor exam: Moves all four extremities spontaneously. No abnormal movements. No tremor.     Assessment:   Shaina Dietz is a 31 y.o. female  presenting to discuss unusual events of possible neurological origin.  Overall, clinical picture is unclear at this time if the patient is suffering with epileptic events given clinical description and the fact that she is on therapeutic amounts of Trileptal and Lamictal with good compliance.  It is more likely that the patient is suffering with nonepileptic events.  Given age and recent onset, we will initiate further evaluation with MRI brain and EEG.     Plan:     Problem List Items Addressed This Visit        Neuro    Seizure-like activity    Relevant Orders    MRI Brain W WO Contrast    Basic metabolic panel    EEG,w/awake & drowsy record    Transient neurological symptoms      Other Visit Diagnoses     Other amnesia        Relevant Orders    MRI Brain W WO Contrast    Basic metabolic panel    EEG,w/awake & drowsy record    Convulsions, unspecified convulsion type        Relevant Orders    MRI Brain W WO Contrast    Basic metabolic panel    EEG,w/awake & drowsy record        - Continue lamictal and trileptal as prescribed by outside provider.  These medications are prescribed for mood stabilization related to bipolar 2 disorder.  By nature, these medications are also excellent antiepileptics.  - routine safety measures  - patient was counseled that our office would reach out by phone or chart message when MRI and EEG reports become available.    Follow-up in 6 weeks or as needed.    Distant Site Telemedicine Encounter   I conducted this encounter from [facility] facilities via secure, live, face-to-face video conference with cecil who was at their home. Prior to the interview, the risks and benefits of telemedicine were discussed with the patient and verbal consent was obtained.? All participants were identified and introduced.Patient was notified that they may decline to receive medical services by telemedicine and may withdraw from such care at any time. Patient's location within [facility] was  confirmed verbally.    I spent a total of 45 minutes preparing for the visit, documenting the visit, and direct face to face (telehealth) time.?       A dictation device was used to produce this document. Use of such devices sometimes results in grammatical errors or replacement of words that sound similarly.    Collin Camarena, DO

## 2022-01-19 ENCOUNTER — PATIENT MESSAGE (OUTPATIENT)
Dept: NEUROLOGY | Facility: CLINIC | Age: 32
End: 2022-01-19
Payer: COMMERCIAL

## 2022-01-24 ENCOUNTER — LAB VISIT (OUTPATIENT)
Dept: LAB | Facility: HOSPITAL | Age: 32
End: 2022-01-24
Attending: PSYCHIATRY & NEUROLOGY
Payer: COMMERCIAL

## 2022-01-24 DIAGNOSIS — R56.9 SEIZURE-LIKE ACTIVITY: ICD-10-CM

## 2022-01-24 DIAGNOSIS — R56.9 CONVULSIONS, UNSPECIFIED CONVULSION TYPE: ICD-10-CM

## 2022-01-24 DIAGNOSIS — R41.3 OTHER AMNESIA: ICD-10-CM

## 2022-01-24 LAB
ANION GAP SERPL CALC-SCNC: 8 MMOL/L (ref 8–16)
BUN SERPL-MCNC: 11 MG/DL (ref 6–20)
CALCIUM SERPL-MCNC: 9.8 MG/DL (ref 8.7–10.5)
CHLORIDE SERPL-SCNC: 102 MMOL/L (ref 95–110)
CO2 SERPL-SCNC: 24 MMOL/L (ref 23–29)
CREAT SERPL-MCNC: 0.7 MG/DL (ref 0.5–1.4)
EST. GFR  (AFRICAN AMERICAN): >60 ML/MIN/1.73 M^2
EST. GFR  (NON AFRICAN AMERICAN): >60 ML/MIN/1.73 M^2
GLUCOSE SERPL-MCNC: 90 MG/DL (ref 70–110)
POTASSIUM SERPL-SCNC: 4.5 MMOL/L (ref 3.5–5.1)
SODIUM SERPL-SCNC: 134 MMOL/L (ref 136–145)

## 2022-01-24 PROCEDURE — 36415 COLL VENOUS BLD VENIPUNCTURE: CPT | Performed by: PSYCHIATRY & NEUROLOGY

## 2022-01-24 PROCEDURE — 80048 BASIC METABOLIC PNL TOTAL CA: CPT | Performed by: PSYCHIATRY & NEUROLOGY

## 2022-01-25 ENCOUNTER — HOSPITAL ENCOUNTER (OUTPATIENT)
Dept: RADIOLOGY | Facility: HOSPITAL | Age: 32
Discharge: HOME | End: 2022-01-25
Attending: PSYCHIATRY & NEUROLOGY
Payer: COMMERCIAL

## 2022-01-25 ENCOUNTER — HOSPITAL ENCOUNTER (OUTPATIENT)
Dept: NEUROLOGY | Facility: CLINIC | Age: 32
Discharge: HOME | End: 2022-01-25
Payer: COMMERCIAL

## 2022-01-25 DIAGNOSIS — R56.9 SEIZURE-LIKE ACTIVITY: ICD-10-CM

## 2022-01-25 DIAGNOSIS — R29.818 TRANSIENT NEUROLOGICAL SYMPTOMS: ICD-10-CM

## 2022-01-25 DIAGNOSIS — R56.9 CONVULSIONS, UNSPECIFIED CONVULSION TYPE: ICD-10-CM

## 2022-01-25 DIAGNOSIS — R41.3 OTHER AMNESIA: ICD-10-CM

## 2022-01-25 PROCEDURE — 70553 MRI BRAIN W WO CONTRAST: ICD-10-PCS | Mod: 26,,, | Performed by: RADIOLOGY

## 2022-01-25 PROCEDURE — 70553 MRI BRAIN STEM W/O & W/DYE: CPT | Mod: TC

## 2022-01-25 PROCEDURE — 70553 MRI BRAIN STEM W/O & W/DYE: CPT | Mod: 26,,, | Performed by: RADIOLOGY

## 2022-01-25 PROCEDURE — 25500020 PHARM REV CODE 255: Performed by: PSYCHIATRY & NEUROLOGY

## 2022-01-25 PROCEDURE — 95819 PR EEG,W/AWAKE & ASLEEP RECORD: ICD-10-PCS | Mod: S$GLB,,, | Performed by: PSYCHIATRY & NEUROLOGY

## 2022-01-25 PROCEDURE — 95819 EEG AWAKE AND ASLEEP: CPT | Mod: S$GLB,,, | Performed by: PSYCHIATRY & NEUROLOGY

## 2022-01-25 PROCEDURE — A9585 GADOBUTROL INJECTION: HCPCS | Performed by: PSYCHIATRY & NEUROLOGY

## 2022-01-25 RX ORDER — GADOBUTROL 604.72 MG/ML
10 INJECTION INTRAVENOUS
Status: COMPLETED | OUTPATIENT
Start: 2022-01-25 | End: 2022-01-25

## 2022-01-25 RX ADMIN — GADOBUTROL 10 ML: 604.72 INJECTION INTRAVENOUS at 07:01

## 2022-01-26 ENCOUNTER — PATIENT MESSAGE (OUTPATIENT)
Dept: NEUROLOGY | Facility: CLINIC | Age: 32
End: 2022-01-26
Payer: COMMERCIAL

## 2022-01-26 NOTE — PROCEDURES
Routine EEG Report    Shaina Dietz  [id number]  1990    DATE OF SERVICE: 1/25/2022  REASON FOR CONSULT:  31-year-old woman with recurrent episodes of altered cognition, difficulty speaking, and decreased responsiveness.  Evaluate for evidence of epileptiform activity.    METHODOLOGY   Electroencephalographic (EEG) recording is with electrodes placed according to the International 10-20 placement system.  Thirty two (32) channels of digital signal (sampling rate of 512/sec) including T1 and T2 was simultaneously recorded from the scalp and may include  EKG, EMG, and/or eye monitors.  Recording band pass was 0.1 to 512 hz.  Digital video recording of the patient is simultaneously recorded with the EEG.  The patient is instructed report clinical symptoms which may occur during the recording session.  EEG and video recording is stored and archived in digital format. Activation procedures which include photic stimulation, hyperventilation and instructing patients to perform simple task are done in selected patients.    The EEG is displayed on a monitor screen and can be reviewed using different montages.  Computer assisted analysis is employed to detect spike and electrographic seizure activity.   The entire record is submitted for computer analysis.  The entire recording is visually reviewed and the times identified by computer analysis as being spikes or seizures are reviewed again.  Compresses spectral analysis (CSA) is also performed on the activity recorded from each individual channel.  This is displayed as a power display of frequencies from 0 to 30 Hz over time.   The CSA is reviewed looking for asymmetries in power between homologous areas of the scalp and then compared with the original EEG recording.     The [employer] software is also utilized in the review of this study.  This software suite analyzes the EEG recording in multiple domains.  Coherence and rhythmicity is computed to identify EEG sections which may  contain organized seizures.  Each channel undergoes analysis to detect presence of spike and sharp waves which have special and morphological characteristic of epileptic activity.  The routine EEG recording is converted from spacial into frequency domain.  This is then displayed comparing homologous areas to identify areas of significant asymmetry.  Algorithm to identify non-cortically generated artifact is used to separate eye movement, EMG and other artifact from the EEG.      EEG FINDINGS  Background activity:   Background is continuous and symmetric with a well-formed 10 hz posterior dominant rhythm seen bilaterally.    Sleep:  The patient transitions from wakefulness to sleep with the appearance of sleep spindles, K complexes, and vertex waves.    Activation procedures:   The patient is able to follow simple commands and answers orientation questions correctly. Photic stimulation is performed with a symmetric photic driving response noted at 6-15 flashes per second (fps).  Hyperventilation is performed with good effort with no activation of the record.     Cardiac Monitor:   Heart rate appears generally regular on a single lead EKG.    Impression:   This is a normal awake and asleep routine EEG.  There are no prominent focal findings, no epileptiform discharges, and no electrographic seizures.   Of note, a normal EEG does not rule out the diagnosis of epilepsy.  Clinical correlation is advised.    Ryanne Greenwood MD PhD  Neurology-Epilepsy  [facility].

## 2022-02-18 ENCOUNTER — OFFICE VISIT (OUTPATIENT)
Dept: NEUROLOGY | Facility: CLINIC | Age: 32
End: 2022-02-18
Payer: COMMERCIAL

## 2022-02-18 VITALS
BODY MASS INDEX: 36.65 KG/M2 | DIASTOLIC BLOOD PRESSURE: 81 MMHG | HEIGHT: 65 IN | SYSTOLIC BLOOD PRESSURE: 127 MMHG | WEIGHT: 220 LBS | HEART RATE: 89 BPM

## 2022-02-18 DIAGNOSIS — Z86.69 HISTORY OF MIGRAINE HEADACHES: ICD-10-CM

## 2022-02-18 DIAGNOSIS — R29.818 TRANSIENT NEUROLOGICAL SYMPTOMS: Primary | ICD-10-CM

## 2022-02-18 DIAGNOSIS — F31.81 BIPOLAR 2 DISORDER: ICD-10-CM

## 2022-02-18 PROBLEM — R56.9 SEIZURE-LIKE ACTIVITY: Status: RESOLVED | Noted: 2022-01-12 | Resolved: 2022-02-18

## 2022-02-18 PROCEDURE — 1159F MED LIST DOCD IN RCRD: CPT | Mod: CPTII,S$GLB,, | Performed by: PSYCHIATRY & NEUROLOGY

## 2022-02-18 PROCEDURE — 3008F BODY MASS INDEX DOCD: CPT | Mod: CPTII,S$GLB,, | Performed by: PSYCHIATRY & NEUROLOGY

## 2022-02-18 PROCEDURE — 99999 PR PBB SHADOW E&M-EST. PATIENT-LVL III: CPT | Mod: PBBFAC,,, | Performed by: PSYCHIATRY & NEUROLOGY

## 2022-02-18 PROCEDURE — 99215 PR OFFICE/OUTPT VISIT, EST, LEVL V, 40-54 MIN: ICD-10-PCS | Mod: S$GLB,,, | Performed by: PSYCHIATRY & NEUROLOGY

## 2022-02-18 PROCEDURE — 1159F PR MEDICATION LIST DOCUMENTED IN MEDICAL RECORD: ICD-10-PCS | Mod: CPTII,S$GLB,, | Performed by: PSYCHIATRY & NEUROLOGY

## 2022-02-18 PROCEDURE — 3008F PR BODY MASS INDEX (BMI) DOCUMENTED: ICD-10-PCS | Mod: CPTII,S$GLB,, | Performed by: PSYCHIATRY & NEUROLOGY

## 2022-02-18 PROCEDURE — 99999 PR PBB SHADOW E&M-EST. PATIENT-LVL III: ICD-10-PCS | Mod: PBBFAC,,, | Performed by: PSYCHIATRY & NEUROLOGY

## 2022-02-18 PROCEDURE — 1160F RVW MEDS BY RX/DR IN RCRD: CPT | Mod: CPTII,S$GLB,, | Performed by: PSYCHIATRY & NEUROLOGY

## 2022-02-18 PROCEDURE — 99215 OFFICE O/P EST HI 40 MIN: CPT | Mod: S$GLB,,, | Performed by: PSYCHIATRY & NEUROLOGY

## 2022-02-18 PROCEDURE — 3079F DIAST BP 80-89 MM HG: CPT | Mod: CPTII,S$GLB,, | Performed by: PSYCHIATRY & NEUROLOGY

## 2022-02-18 PROCEDURE — 3079F PR MOST RECENT DIASTOLIC BLOOD PRESSURE 80-89 MM HG: ICD-10-PCS | Mod: CPTII,S$GLB,, | Performed by: PSYCHIATRY & NEUROLOGY

## 2022-02-18 PROCEDURE — 3074F PR MOST RECENT SYSTOLIC BLOOD PRESSURE < 130 MM HG: ICD-10-PCS | Mod: CPTII,S$GLB,, | Performed by: PSYCHIATRY & NEUROLOGY

## 2022-02-18 PROCEDURE — 3074F SYST BP LT 130 MM HG: CPT | Mod: CPTII,S$GLB,, | Performed by: PSYCHIATRY & NEUROLOGY

## 2022-02-18 PROCEDURE — 1160F PR REVIEW ALL MEDS BY PRESCRIBER/CLIN PHARMACIST DOCUMENTED: ICD-10-PCS | Mod: CPTII,S$GLB,, | Performed by: PSYCHIATRY & NEUROLOGY

## 2022-02-18 NOTE — PROGRESS NOTES
[facility] NEUROLOGY  [facility], [place]    Date: 2/18/22  Patient Name: Shaina Dietz   MRN: [id number]   PCP: Neil Escalante  Referring Provider: Self, Aaareferral     Chief Complaint:  Follow-up for transient neurological symptoms  Subjective:     02/18/22:  Patient shares that she has been doing somewhat better since previous encounter.  She has only had to of the previously described episodes since previous encounter.  One occurred this past week while at dinner where she felt sudden onset, the mentation, trouble forming sentences, and increased anxiety.  Most intense symptoms last for about 1 minute.  Additional 5-6 minutes of coming down from the experience.  MRI and EEG were completed after previous encounter; both studies found to be normal. Patient continues on Trileptal 900 mg daily and Lamictal 400 mg daily for bipolar disorder.  No recognized side effects from these medications.  No new medical or neurological complaints.     ==============================================  01/12/22 HPI:   Ms. hSaina Dietz is a 31 y.o. female presenting to discuss events that she is concerned may be with seizure activity.  She shares that starting approximately 13 months ago, she began to experience sudden onset 1-2 minute episodes with melissa vu, feeling of impending doom, alterations in hearing (described as feeling like her hearing is underwater), cognitive fog, word-finding difficulty or inability to speak.  Her partner who is present for today's encounter as that the patient sometimes seems to clinch her hands, can not seem to interact with others, and finds that the patient is very fatigued for several hours after the events.  Patient states that she sometimes feels as if she is going to pass out during the episodes but has never lost consciousness.  She endorses full recollection of each event with no bowel/bladder incontinence, rhythmic shaking, tongue biting, or uncontrolled  movements.    She estimates an average frequency of once per week.  She has had days for more than 1 of the episodes occurred.  She has experienced several weeks symptom-free periods.  Has not had an episode in over 2 weeks.    No personal history of seizures, neurological infections, or significant head trauma.  Family history includes father with epilepsy that began in his youth controlled with medication.    Currently on a multidrug regimen for bipolar 2 disorder.  These medications include Trileptal 900 mg q.h.s., Lamictal 400 mg q.h.s., and clonazepam 0.5-1 mg as needed.  Endorses good compliance with these medications.  No recent drug changes though she notes Trileptal was mildly lowered earlier this year due to memory complaints; memory improved with doses she is currently at.    May have had neurological imaging as a child but denies imaging in adulthood.  ==============================================  CURRENT MEDS:  Current Outpatient Medications   Medication Sig Dispense Refill    clonazePAM (KLONOPIN) 1 MG tablet Take 0.5-1 mg by mouth daily as needed.      fluticasone propionate (FLONASE) 50 mcg/actuation nasal spray 2 sprays (100 mcg total) by Each Nostril route once daily. 9.9 mL 0    lamoTRIgine (LAMICTAL) 200 MG tablet       OLANZapine (ZYPREXA) 5 MG tablet Take 5 mg by mouth nightly.      OXcarbazepine (TRILEPTAL) 300 MG Tab TK 4 TS PO QHS  6     No current facility-administered medications for this visit.       ALLERGIES:  Review of patient's allergies indicates:  No Known Allergies       Objective:     Vitals:    02/18/22 0808   BP: 127/81   Pulse: 89     General: Male in NAD, alert and awake, AOx3 (year, month, day, date along with country, state, city, hospital and situation.) well groomed. ?    ? ?    HEENT: Head is NC/AT EOMI, pupil size: 4 mm B/L, no nystagmus noted; hearing grossly intact b/l. Mucous membrane moist, uvula midline, no pharyngeal erythema,exudates or discharges.       Neck: Supple. no nuchal rigidity. no lymphadenopathy noted on neck. no bruits ?were noted B/L.       Cardiovascular: well perfused, no cyanosis        Respiratory: Symmetric chest rise noted       Gastrointestinal: soft, non-tender, no distention.       Musculoskeletal: Muscle tone noted to be adequate for patient age, muscle mass is WNL. No spontaneous movements or fasciculations noted during this examination.       Extremities: No pedal edema or calf tenderness. Pulses were present and palpable (2+) in B/L upper (radial) and lower extremities (DP). No cogwheel rigidity noted on B/L UE extremities.       Neurological Examination.    Mental status: AA&O x3; Affect/mood is euthymic/congruent; no aphasia noted during examination (able to name watch/pen). Patient answers simple questions appropriately & follows simple commands; no dysarthria or expressive aphasia; no juan carlos-neglect or extinction. Instant recall is 3/3 at 1 minute and 3/3 at 5 minutes. Vocabulary/word finding: excellent.       Cranial Nerves: II-XII grossly intact.      Muscle/motor Function: Tone WNL and Muscle bulk WNL.  5/5 throughout.  No tremors.  No uncontrolled movements.     Sensory:  Intact to light touch throughout     Reflexes: Left and Right biceps, triceps, brachioradialis are 2+/4. patellar 2+/4 on Left and 2+/4 on Right     Coordination: no dysmetria (finger to nose negative)     Gait: adequate casual gait with stride length and arm swing WNL. Tandem gait and walking on toes and heels are WNL     Other:  01/25/2022 MRI brain with/without contrast:  FINDINGS:  No abnormal focus of diffusion restriction.  No abnormal susceptibility focus to suggest sequela of remote microhemorrhage.  No extra-axial collection or midline shift.  No hydrocephalus.  Major T2 intracranial vascular flow voids are maintained.  Post-contrast images demonstrate no abnormal parenchymal or dural enhancement.  T1 marrow signal is maintained.  Impression:  No acute  "intracranial abnormality."    01/25/2022 eeg:  Impression:   This is a normal awake and asleep routine EEG.  There are no prominent focal findings, no epileptiform discharges, and no electrographic seizures.   Of note, a normal EEG does not rule out the diagnosis of epilepsy.  Clinical correlation is advised."      Assessment:   Shaina Dietz is a 31 y.o. female presenting for follow-up regarding transient neurological symptoms.  Since previous encounter, the frequency of these events has decreased.  Normal imaging and EEG along with therapeutic doses of 2 antiepileptics decrease the likelihood that these recent onset events are epileptic in origin however this cannot be completely ruled out.  We briefly discussed attempting a reduction of Trileptal if Psychiatry agrees as this medication has been previously linked to memory disturbance in her case.  No further interventions from a neurological perspective.  The patient was encouraged to reach out if questions or concerns arise or if symptoms change or worsen.    Plan:     Problem List Items Addressed This Visit    None       - Continue lamictal and trileptal as prescribed by outside provider.  These medications are prescribed for mood stabilization related to bipolar 2 disorder.    - follow-up with Psychiatry per the recommendations    Follow-up with our clinic in the future as needed.    I spent a total of 40 minutes on the day of the visit. This includes face to face time and non-face to face time preparing to see the patient (eg, review of tests), obtaining and/or reviewing separately obtained history, documenting clinical information in the electronic or other health record, independently interpreting results and communicating results to the patient/family/caregiver, or care coordinator.    A dictation device was used to produce this document. Use of such devices sometimes results in grammatical errors or replacement of words that sound similarly.    Collin LOREDO" Migue, DO

## 2022-08-05 ENCOUNTER — TELEPHONE (OUTPATIENT)
Dept: BARIATRICS | Facility: CLINIC | Age: 32
End: 2022-08-05
Payer: COMMERCIAL

## 2022-08-16 ENCOUNTER — TELEPHONE (OUTPATIENT)
Dept: BARIATRICS | Facility: CLINIC | Age: 32
End: 2022-08-16
Payer: COMMERCIAL

## 2022-08-31 ENCOUNTER — TELEPHONE (OUTPATIENT)
Dept: BARIATRICS | Facility: CLINIC | Age: 32
End: 2022-08-31
Payer: COMMERCIAL

## 2022-09-09 ENCOUNTER — TELEPHONE (OUTPATIENT)
Dept: BARIATRICS | Facility: CLINIC | Age: 32
End: 2022-09-09
Payer: COMMERCIAL

## 2022-09-12 ENCOUNTER — TELEPHONE (OUTPATIENT)
Dept: BARIATRICS | Facility: CLINIC | Age: 32
End: 2022-09-12
Payer: COMMERCIAL

## 2022-09-20 ENCOUNTER — TELEPHONE (OUTPATIENT)
Dept: BARIATRICS | Facility: CLINIC | Age: 32
End: 2022-09-20
Payer: COMMERCIAL

## 2022-09-22 ENCOUNTER — HOSPITAL ENCOUNTER (OUTPATIENT)
Facility: HOSPITAL | Age: 32
Discharge: HOME | End: 2022-09-24
Attending: EMERGENCY MEDICINE | Admitting: STUDENT IN AN ORGANIZED HEALTH CARE EDUCATION/TRAINING PROGRAM
Payer: COMMERCIAL

## 2022-09-22 DIAGNOSIS — R29.818 TRANSIENT NEUROLOGICAL SYMPTOMS: Primary | ICD-10-CM

## 2022-09-22 DIAGNOSIS — R56.9 SEIZURE: ICD-10-CM

## 2022-09-22 DIAGNOSIS — R07.9 CHEST PAIN: ICD-10-CM

## 2022-09-22 LAB
ALBUMIN SERPL BCP-MCNC: 4 G/DL (ref 3.5–5.2)
ALP SERPL-CCNC: 79 U/L (ref 55–135)
ALT SERPL W/O P-5'-P-CCNC: 14 U/L (ref 10–44)
ANION GAP SERPL CALC-SCNC: 9 MMOL/L (ref 8–16)
AST SERPL-CCNC: 25 U/L (ref 10–40)
B-HCG UR QL: NEGATIVE
BACTERIA #/AREA URNS AUTO: ABNORMAL /HPF
BASOPHILS # BLD AUTO: 0.06 K/UL (ref 0–0.2)
BASOPHILS NFR BLD: 0.5 % (ref 0–1.9)
BILIRUB SERPL-MCNC: 0.2 MG/DL (ref 0.1–1)
BILIRUB UR QL STRIP: NEGATIVE
BUN SERPL-MCNC: 13 MG/DL (ref 6–20)
CALCIUM SERPL-MCNC: 9.4 MG/DL (ref 8.7–10.5)
CHLORIDE SERPL-SCNC: 105 MMOL/L (ref 95–110)
CK SERPL-CCNC: 138 U/L (ref 20–180)
CLARITY UR REFRACT.AUTO: CLEAR
CO2 SERPL-SCNC: 21 MMOL/L (ref 23–29)
COLOR UR AUTO: YELLOW
CREAT SERPL-MCNC: 0.9 MG/DL (ref 0.5–1.4)
CTP QC/QA: YES
DIFFERENTIAL METHOD: ABNORMAL
EOSINOPHIL # BLD AUTO: 0.1 K/UL (ref 0–0.5)
EOSINOPHIL NFR BLD: 0.7 % (ref 0–8)
ERYTHROCYTE [DISTWIDTH] IN BLOOD BY AUTOMATED COUNT: 13 % (ref 11.5–14.5)
EST. GFR  (NO RACE VARIABLE): >60 ML/MIN/1.73 M^2
GLUCOSE SERPL-MCNC: 93 MG/DL (ref 70–110)
GLUCOSE UR QL STRIP: NEGATIVE
HCT VFR BLD AUTO: 39.7 % (ref 37–48.5)
HGB BLD-MCNC: 14.5 G/DL (ref 12–16)
HGB UR QL STRIP: ABNORMAL
HYALINE CASTS UR QL AUTO: 3 /LPF
IMM GRANULOCYTES # BLD AUTO: 0.06 K/UL (ref 0–0.04)
IMM GRANULOCYTES NFR BLD AUTO: 0.5 % (ref 0–0.5)
KETONES UR QL STRIP: ABNORMAL
LEUKOCYTE ESTERASE UR QL STRIP: NEGATIVE
LYMPHOCYTES # BLD AUTO: 1.5 K/UL (ref 1–4.8)
LYMPHOCYTES NFR BLD: 13.2 % (ref 18–48)
MCH RBC QN AUTO: 32.2 PG (ref 27–31)
MCHC RBC AUTO-ENTMCNC: 36.5 G/DL (ref 32–36)
MCV RBC AUTO: 88 FL (ref 82–98)
MICROSCOPIC COMMENT: ABNORMAL
MONOCYTES # BLD AUTO: 0.6 K/UL (ref 0.3–1)
MONOCYTES NFR BLD: 5.6 % (ref 4–15)
NEUTROPHILS # BLD AUTO: 9 K/UL (ref 1.8–7.7)
NEUTROPHILS NFR BLD: 79.5 % (ref 38–73)
NITRITE UR QL STRIP: NEGATIVE
NRBC BLD-RTO: 0 /100 WBC
PH UR STRIP: 6 [PH] (ref 5–8)
PLATELET # BLD AUTO: 360 K/UL (ref 150–450)
PMV BLD AUTO: 10 FL (ref 9.2–12.9)
POTASSIUM SERPL-SCNC: 4.3 MMOL/L (ref 3.5–5.1)
PROT SERPL-MCNC: 7.6 G/DL (ref 6–8.4)
PROT UR QL STRIP: ABNORMAL
RBC # BLD AUTO: 4.51 M/UL (ref 4–5.4)
RBC #/AREA URNS AUTO: 3 /HPF (ref 0–4)
SODIUM SERPL-SCNC: 135 MMOL/L (ref 136–145)
SP GR UR STRIP: 1.02 (ref 1–1.03)
SQUAMOUS #/AREA URNS AUTO: 1 /HPF
URN SPEC COLLECT METH UR: ABNORMAL
WBC # BLD AUTO: 11.31 K/UL (ref 3.9–12.7)
WBC #/AREA URNS AUTO: 2 /HPF (ref 0–5)

## 2022-09-22 PROCEDURE — G0378 HOSPITAL OBSERVATION PER HR: HCPCS

## 2022-09-22 PROCEDURE — 81025 URINE PREGNANCY TEST: CPT

## 2022-09-22 PROCEDURE — 81001 URINALYSIS AUTO W/SCOPE: CPT

## 2022-09-22 PROCEDURE — 99285 EMERGENCY DEPT VISIT HI MDM: CPT | Mod: 25

## 2022-09-22 PROCEDURE — 96374 THER/PROPH/DIAG INJ IV PUSH: CPT

## 2022-09-22 PROCEDURE — 99284 EMERGENCY DEPT VISIT MOD MDM: CPT | Mod: ,,,

## 2022-09-22 PROCEDURE — 25000003 PHARM REV CODE 250

## 2022-09-22 PROCEDURE — 80053 COMPREHEN METABOLIC PANEL: CPT

## 2022-09-22 PROCEDURE — 82550 ASSAY OF CK (CPK): CPT

## 2022-09-22 PROCEDURE — 99284 PR EMERGENCY DEPT VISIT,LEVEL IV: ICD-10-PCS | Mod: ,,,

## 2022-09-22 PROCEDURE — 63600175 PHARM REV CODE 636 W HCPCS: Performed by: FAMILY MEDICINE

## 2022-09-22 PROCEDURE — 25000003 PHARM REV CODE 250: Performed by: FAMILY MEDICINE

## 2022-09-22 PROCEDURE — 99220 PR INITIAL OBSERVATION CARE,LEVL III: ICD-10-PCS | Mod: ,,, | Performed by: FAMILY MEDICINE

## 2022-09-22 PROCEDURE — 80175 DRUG SCREEN QUAN LAMOTRIGINE: CPT

## 2022-09-22 PROCEDURE — 93010 ELECTROCARDIOGRAM REPORT: CPT | Mod: ,,, | Performed by: INTERNAL MEDICINE

## 2022-09-22 PROCEDURE — 93005 ELECTROCARDIOGRAM TRACING: CPT

## 2022-09-22 PROCEDURE — 93010 EKG 12-LEAD: ICD-10-PCS | Mod: ,,, | Performed by: INTERNAL MEDICINE

## 2022-09-22 PROCEDURE — 99220 PR INITIAL OBSERVATION CARE,LEVL III: CPT | Mod: ,,, | Performed by: FAMILY MEDICINE

## 2022-09-22 PROCEDURE — 96361 HYDRATE IV INFUSION ADD-ON: CPT

## 2022-09-22 PROCEDURE — 85025 COMPLETE CBC W/AUTO DIFF WBC: CPT

## 2022-09-22 RX ORDER — ENOXAPARIN SODIUM 100 MG/ML
40 INJECTION SUBCUTANEOUS EVERY 24 HOURS
Status: DISCONTINUED | OUTPATIENT
Start: 2022-09-22 | End: 2022-09-24 | Stop reason: HOSPADM

## 2022-09-22 RX ORDER — KETOROLAC TROMETHAMINE 30 MG/ML
15 INJECTION, SOLUTION INTRAMUSCULAR; INTRAVENOUS ONCE
Status: COMPLETED | OUTPATIENT
Start: 2022-09-22 | End: 2022-09-22

## 2022-09-22 RX ORDER — IBUPROFEN 200 MG
16 TABLET ORAL
Status: DISCONTINUED | OUTPATIENT
Start: 2022-09-22 | End: 2022-09-24 | Stop reason: HOSPADM

## 2022-09-22 RX ORDER — ONDANSETRON 2 MG/ML
4 INJECTION INTRAMUSCULAR; INTRAVENOUS EVERY 8 HOURS PRN
Status: DISCONTINUED | OUTPATIENT
Start: 2022-09-22 | End: 2022-09-24 | Stop reason: HOSPADM

## 2022-09-22 RX ORDER — LAMOTRIGINE 100 MG/1
400 TABLET ORAL DAILY
Status: DISCONTINUED | OUTPATIENT
Start: 2022-09-23 | End: 2022-09-24 | Stop reason: HOSPADM

## 2022-09-22 RX ORDER — ACETAMINOPHEN 325 MG/1
650 TABLET ORAL
Status: COMPLETED | OUTPATIENT
Start: 2022-09-22 | End: 2022-09-22

## 2022-09-22 RX ORDER — ACETAMINOPHEN 500 MG
1000 TABLET ORAL EVERY 8 HOURS PRN
Status: DISCONTINUED | OUTPATIENT
Start: 2022-09-22 | End: 2022-09-24 | Stop reason: HOSPADM

## 2022-09-22 RX ORDER — LAMOTRIGINE 100 MG/1
200 TABLET ORAL DAILY
Status: DISCONTINUED | OUTPATIENT
Start: 2022-09-23 | End: 2022-09-22

## 2022-09-22 RX ORDER — IBUPROFEN 200 MG
24 TABLET ORAL
Status: DISCONTINUED | OUTPATIENT
Start: 2022-09-22 | End: 2022-09-24 | Stop reason: HOSPADM

## 2022-09-22 RX ORDER — OXCARBAZEPINE 300 MG/1
300 TABLET, FILM COATED ORAL NIGHTLY
Status: DISCONTINUED | OUTPATIENT
Start: 2022-09-22 | End: 2022-09-22

## 2022-09-22 RX ORDER — ONDANSETRON 4 MG/1
4 TABLET, ORALLY DISINTEGRATING ORAL
Status: COMPLETED | OUTPATIENT
Start: 2022-09-22 | End: 2022-09-22

## 2022-09-22 RX ORDER — NALOXONE HCL 0.4 MG/ML
0.02 VIAL (ML) INJECTION
Status: DISCONTINUED | OUTPATIENT
Start: 2022-09-22 | End: 2022-09-24 | Stop reason: HOSPADM

## 2022-09-22 RX ORDER — GLUCAGON 1 MG
1 KIT INJECTION
Status: DISCONTINUED | OUTPATIENT
Start: 2022-09-22 | End: 2022-09-24 | Stop reason: HOSPADM

## 2022-09-22 RX ORDER — MAG HYDROX/ALUMINUM HYD/SIMETH 200-200-20
30 SUSPENSION, ORAL (FINAL DOSE FORM) ORAL 4 TIMES DAILY PRN
Status: DISCONTINUED | OUTPATIENT
Start: 2022-09-22 | End: 2022-09-24 | Stop reason: HOSPADM

## 2022-09-22 RX ORDER — SODIUM CHLORIDE 0.9 % (FLUSH) 0.9 %
10 SYRINGE (ML) INJECTION EVERY 12 HOURS PRN
Status: DISCONTINUED | OUTPATIENT
Start: 2022-09-22 | End: 2022-09-24 | Stop reason: HOSPADM

## 2022-09-22 RX ORDER — TALC
6 POWDER (GRAM) TOPICAL NIGHTLY PRN
Status: DISCONTINUED | OUTPATIENT
Start: 2022-09-22 | End: 2022-09-24 | Stop reason: HOSPADM

## 2022-09-22 RX ORDER — OXCARBAZEPINE 300 MG/1
900 TABLET, FILM COATED ORAL NIGHTLY
Status: DISCONTINUED | OUTPATIENT
Start: 2022-09-22 | End: 2022-09-23

## 2022-09-22 RX ORDER — SODIUM CHLORIDE 0.9 % (FLUSH) 0.9 %
10 SYRINGE (ML) INJECTION
Status: DISCONTINUED | OUTPATIENT
Start: 2022-09-22 | End: 2022-09-24 | Stop reason: HOSPADM

## 2022-09-22 RX ORDER — DIAZEPAM 2 MG/1
2 TABLET ORAL ONCE
Status: COMPLETED | OUTPATIENT
Start: 2022-09-22 | End: 2022-09-22

## 2022-09-22 RX ADMIN — KETOROLAC TROMETHAMINE 15 MG: 30 INJECTION, SOLUTION INTRAMUSCULAR; INTRAVENOUS at 10:09

## 2022-09-22 RX ADMIN — ONDANSETRON 4 MG: 4 TABLET, ORALLY DISINTEGRATING ORAL at 08:09

## 2022-09-22 RX ADMIN — SODIUM CHLORIDE 1000 ML: 0.9 INJECTION, SOLUTION INTRAVENOUS at 08:09

## 2022-09-22 RX ADMIN — ACETAMINOPHEN 650 MG: 325 TABLET ORAL at 08:09

## 2022-09-22 RX ADMIN — DIAZEPAM 2 MG: 2 TABLET ORAL at 10:09

## 2022-09-22 NOTE — Clinical Note
Diagnosis: Seizure [205090]   Future Attending Provider: MAHNAZ LUBIN [[id number]]   Is the patient being sent to ED Observation?: Yes   Admitting Provider:: MAHNAZ LUBIN [[id number]]

## 2022-09-23 PROBLEM — E66.09 CLASS 2 OBESITY DUE TO EXCESS CALORIES WITHOUT SERIOUS COMORBIDITY WITH BODY MASS INDEX (BMI) OF 36.0 TO 36.9 IN ADULT: Status: ACTIVE | Noted: 2022-09-23

## 2022-09-23 PROBLEM — E66.812 CLASS 2 OBESITY DUE TO EXCESS CALORIES WITHOUT SERIOUS COMORBIDITY WITH BODY MASS INDEX (BMI) OF 36.0 TO 36.9 IN ADULT: Status: ACTIVE | Noted: 2022-09-23

## 2022-09-23 LAB
ALBUMIN SERPL BCP-MCNC: 3.5 G/DL (ref 3.5–5.2)
ALP SERPL-CCNC: 70 U/L (ref 55–135)
ALT SERPL W/O P-5'-P-CCNC: 11 U/L (ref 10–44)
ANION GAP SERPL CALC-SCNC: 6 MMOL/L (ref 8–16)
AST SERPL-CCNC: 17 U/L (ref 10–40)
BASOPHILS # BLD AUTO: 0.04 K/UL (ref 0–0.2)
BASOPHILS NFR BLD: 0.4 % (ref 0–1.9)
BILIRUB SERPL-MCNC: 0.4 MG/DL (ref 0.1–1)
BUN SERPL-MCNC: 10 MG/DL (ref 6–20)
CALCIUM SERPL-MCNC: 8.5 MG/DL (ref 8.7–10.5)
CHLORIDE SERPL-SCNC: 105 MMOL/L (ref 95–110)
CO2 SERPL-SCNC: 24 MMOL/L (ref 23–29)
CREAT SERPL-MCNC: 0.7 MG/DL (ref 0.5–1.4)
DIFFERENTIAL METHOD: NORMAL
EOSINOPHIL # BLD AUTO: 0.1 K/UL (ref 0–0.5)
EOSINOPHIL NFR BLD: 0.8 % (ref 0–8)
ERYTHROCYTE [DISTWIDTH] IN BLOOD BY AUTOMATED COUNT: 13.1 % (ref 11.5–14.5)
EST. GFR  (NO RACE VARIABLE): >60 ML/MIN/1.73 M^2
GLUCOSE SERPL-MCNC: 111 MG/DL (ref 70–110)
HCT VFR BLD AUTO: 39.6 % (ref 37–48.5)
HGB BLD-MCNC: 13.4 G/DL (ref 12–16)
IMM GRANULOCYTES # BLD AUTO: 0.03 K/UL (ref 0–0.04)
IMM GRANULOCYTES NFR BLD AUTO: 0.3 % (ref 0–0.5)
LYMPHOCYTES # BLD AUTO: 2.7 K/UL (ref 1–4.8)
LYMPHOCYTES NFR BLD: 26.1 % (ref 18–48)
MAGNESIUM SERPL-MCNC: 1.7 MG/DL (ref 1.6–2.6)
MCH RBC QN AUTO: 30.7 PG (ref 27–31)
MCHC RBC AUTO-ENTMCNC: 33.8 G/DL (ref 32–36)
MCV RBC AUTO: 91 FL (ref 82–98)
MONOCYTES # BLD AUTO: 0.7 K/UL (ref 0.3–1)
MONOCYTES NFR BLD: 6.6 % (ref 4–15)
NEUTROPHILS # BLD AUTO: 6.9 K/UL (ref 1.8–7.7)
NEUTROPHILS NFR BLD: 65.8 % (ref 38–73)
NRBC BLD-RTO: 0 /100 WBC
PLATELET # BLD AUTO: 286 K/UL (ref 150–450)
PMV BLD AUTO: 9.5 FL (ref 9.2–12.9)
POTASSIUM SERPL-SCNC: 3.6 MMOL/L (ref 3.5–5.1)
PROT SERPL-MCNC: 6.4 G/DL (ref 6–8.4)
RBC # BLD AUTO: 4.36 M/UL (ref 4–5.4)
SODIUM SERPL-SCNC: 135 MMOL/L (ref 136–145)
WBC # BLD AUTO: 10.5 K/UL (ref 3.9–12.7)

## 2022-09-23 PROCEDURE — G0378 HOSPITAL OBSERVATION PER HR: HCPCS

## 2022-09-23 PROCEDURE — 80053 COMPREHEN METABOLIC PANEL: CPT | Performed by: FAMILY MEDICINE

## 2022-09-23 PROCEDURE — 63600175 PHARM REV CODE 636 W HCPCS: Performed by: FAMILY MEDICINE

## 2022-09-23 PROCEDURE — 96372 THER/PROPH/DIAG INJ SC/IM: CPT | Performed by: FAMILY MEDICINE

## 2022-09-23 PROCEDURE — 83735 ASSAY OF MAGNESIUM: CPT | Performed by: FAMILY MEDICINE

## 2022-09-23 PROCEDURE — 85025 COMPLETE CBC W/AUTO DIFF WBC: CPT | Performed by: FAMILY MEDICINE

## 2022-09-23 PROCEDURE — 99226 PR SUBSEQUENT OBSERVATION CARE,LEVEL III: ICD-10-PCS | Mod: ,,, | Performed by: STUDENT IN AN ORGANIZED HEALTH CARE EDUCATION/TRAINING PROGRAM

## 2022-09-23 PROCEDURE — 99214 OFFICE O/P EST MOD 30 MIN: CPT | Mod: ,,, | Performed by: PSYCHIATRY & NEUROLOGY

## 2022-09-23 PROCEDURE — 99214 PR OFFICE/OUTPT VISIT, EST, LEVL IV, 30-39 MIN: ICD-10-PCS | Mod: ,,, | Performed by: PSYCHIATRY & NEUROLOGY

## 2022-09-23 PROCEDURE — 99226 PR SUBSEQUENT OBSERVATION CARE,LEVEL III: CPT | Mod: ,,, | Performed by: STUDENT IN AN ORGANIZED HEALTH CARE EDUCATION/TRAINING PROGRAM

## 2022-09-23 PROCEDURE — 25000003 PHARM REV CODE 250: Performed by: FAMILY MEDICINE

## 2022-09-23 PROCEDURE — 36415 COLL VENOUS BLD VENIPUNCTURE: CPT | Performed by: FAMILY MEDICINE

## 2022-09-23 PROCEDURE — 25000003 PHARM REV CODE 250: Performed by: STUDENT IN AN ORGANIZED HEALTH CARE EDUCATION/TRAINING PROGRAM

## 2022-09-23 RX ORDER — OXCARBAZEPINE 150 MG/1
600 TABLET, FILM COATED ORAL 2 TIMES DAILY
Status: DISCONTINUED | OUTPATIENT
Start: 2022-09-23 | End: 2022-09-24 | Stop reason: HOSPADM

## 2022-09-23 RX ADMIN — ACETAMINOPHEN 1000 MG: 500 TABLET ORAL at 09:09

## 2022-09-23 RX ADMIN — OXCARBAZEPINE 900 MG: 300 TABLET, FILM COATED ORAL at 01:09

## 2022-09-23 RX ADMIN — ENOXAPARIN SODIUM 40 MG: 100 INJECTION SUBCUTANEOUS at 06:09

## 2022-09-23 RX ADMIN — ENOXAPARIN SODIUM 40 MG: 100 INJECTION SUBCUTANEOUS at 01:09

## 2022-09-23 RX ADMIN — LAMOTRIGINE 400 MG: 100 TABLET ORAL at 09:09

## 2022-09-23 RX ADMIN — OXCARBAZEPINE 600 MG: 150 TABLET, FILM COATED ORAL at 09:09

## 2022-09-23 NOTE — CONSULTS
"[facility] ([place] [address])  Neurology  Consult Note    Patient Name: Shaina Dietz  MRN: [id number]  Admission Date: 9/22/2022  Hospital Length of Stay: 0 days  Code Status: Full Code   Attending Provider: Jama Lynch MD   Consulting Provider: Jose L Benito MD  Primary Care Physician: Neil Escalante MD  Principal Problem:Transient neurological symptoms    Inpatient consult to Neurology  Consult performed by: Jose L Benito MD  Consult ordered by: Jama Lynch MD         Subjective:     Chief Complaint: Transient neurological symptoms and seizure-like activity    HPI:   Shaina Dietz is a 32 y.o. F w/ h/o bipolar II d/o (on Lamictal, Trileptal, PRN Klonopin) and self-reported migraine w/ aura for whom Neurology is consulted to evaluate seizure-like activity. Pt reports first experiencing "depersonalization" episodes 2 yrs ago. During these episodes, she feels tired and stares off into the distance, unable to find words, but has a complete recollection of what occurs. She saw Dr. Camarena in outpt neurology in Jan '22 whose assessment was that events were most likely nonepileptic. MRI brain and EEG were unremarkable at that time. Yesterday, pt had 3 of these staring episodes. According to a description by the pt's partner (who witnessed the event), pt was at a hair salon when she felt nauseated and was "more tearful than normal." She had staring that lasted about ~2 min. According to pt, she felt a sense of melissa vu and impending doom and voices were "muffled" as if underwater at this time. Then, according to partner, pt's eyes rolled back and she began "convulsing" and "flailing" for ~5 min. Pt reports losing consciousness w/ no recollection of these movements. She reports tongue-biting but denies incontinence. Pt's lips reportedly became blue. After the shaking ceased, pt was confused, "agitated," w/ a feeling of "coming out of [her] skin."  She reports feeling drowsy before and after event. " "Pt's home medications include Lamictal 400 (missed dose yesterday), Trileptal 750 qhs, Klonopin 0.5-1 as needed (last took 4 d ago), Risperdal 0.25 daily, and Seroquel 50 (about once per month "to reset"). Pt reports taking Benadryl (last 2 d ago) and Nyquil (last 1 wk ago). Pt reports a family hx of juvenile myoclonic epilepsy and stroke in her father. Reports a recent URI a couple wks ago but no recent fever. Denies recent sleep disturbance, EtOH, and substance use. Denies recent life stressors and reports good state of mental health, seeing her therapist weekly.        Past Medical History:   Diagnosis Date    Acne     Bipolar 2 disorder     Hx of migraine headaches        Past Surgical History:   Procedure Laterality Date    HYSTERECTOMY  October 2018    For fibroids - benign. Removed Uterus, Cervix, and Fallopian tubes; not ovaries.    TONSILLECTOMY  2011       Review of patient's allergies indicates:  No Known Allergies    Current Neurological Medications: lamotrigine 400, Trileptal 900    No current facility-administered medications on file prior to encounter.     Current Outpatient Medications on File Prior to Encounter   Medication Sig    clonazePAM (KLONOPIN) 1 MG tablet Take 0.5-1 mg by mouth daily as needed.    fluticasone propionate (FLONASE) 50 mcg/actuation nasal spray 2 sprays (100 mcg total) by Each Nostril route once daily.    lamoTRIgine (LAMICTAL) 200 MG tablet     OLANZapine (ZYPREXA) 5 MG tablet Take 5 mg by mouth nightly.    OXcarbazepine (TRILEPTAL) 300 MG Tab TK 4 TS PO QHS     Family History       Problem Relation (Age of Onset)    Alcohol abuse Mother, Brother    Cancer Mother, Father, Sister, Paternal Grandmother, Paternal Aunt    Depression Mother, Brother, Sister, Maternal Aunt    Early death Paternal Grandmother, Maternal Aunt    Heart disease Mother    Hypertension Mother    Learning disabilities Brother    Mental illness Mother, Brother, Sister, Maternal Aunt    Stroke " Father          Tobacco Use    Smoking status: Former     Packs/day: 0.25     Years: 10.00     Pack years: 2.50     Types: Cigarettes     Start date: 8/1/2009     Quit date: 9/10/2019     Years since quitting: 3.0    Smokeless tobacco: Never   Substance and Sexual Activity    Alcohol use: Not Currently     Alcohol/week: 0.0 standard drinks     Comment: Former binge drinker (9 yrs). Sober 11 months (as of 8/2019)    Drug use: Never    Sexual activity: Yes     Partners: Female     Birth control/protection: Condom, See Surgical Hx, Other-see comments     Comment: Hysterectomy in 2018.     Review of Systems   Constitutional:  Negative for fatigue and fever.   HENT:  Negative for hearing loss and sore throat.    Eyes:  Negative for redness and visual disturbance.   Respiratory:  Negative for cough and shortness of breath.    Cardiovascular:  Negative for chest pain and palpitations.   Gastrointestinal:  Positive for nausea. Negative for constipation, diarrhea and vomiting.   Genitourinary:  Negative for difficulty urinating and dysuria.   Musculoskeletal:  Negative for arthralgias and myalgias.   Skin:  Negative for color change and rash.   Neurological:  Positive for headaches. Negative for dizziness, weakness and numbness.   Psychiatric/Behavioral:  Negative for dysphoric mood and sleep disturbance.    Objective:     Vital Signs (Most Recent):  Temp: 98.4 °F (36.9 °C) (09/23/22 0737)  Pulse: 79 (09/23/22 0737)  Resp: 18 (09/23/22 0737)  BP: (!) 108/59 (09/23/22 0737)  SpO2: 96 % (09/23/22 0737)   Vital Signs (24h Range):  Temp:  [97.4 °F (36.3 °C)-98.5 °F (36.9 °C)] 98.4 °F (36.9 °C)  Pulse:  [] 79  Resp:  [16-18] 18  SpO2:  [96 %-100 %] 96 %  BP: (108-144)/(59-95) 108/59     Weight: 99.8 kg (220 lb)  Body mass index is 36.61 kg/m².    Physical Exam  Eyes:      Extraocular Movements: EOM normal.      Pupils: Pupils are equal, round, and reactive to light.   Neurological:      Mental Status: She is oriented  to person, place, and time.      Motor: Motor strength is normal.      Coordination: Finger-Nose-Finger Test normal.      Deep Tendon Reflexes:      Reflex Scores:       Bicep reflexes are 2+ on the right side and 2+ on the left side.       Brachioradialis reflexes are 2+ on the right side and 2+ on the left side.       Patellar reflexes are 2+ on the right side and 2+ on the left side.       Achilles reflexes are 2+ on the right side and 2+ on the left side.  Psychiatric:         Speech: Speech normal.       NEUROLOGICAL EXAMINATION:     MENTAL STATUS   Oriented to person, place, and time.   Registration: recalls 3 of 3 objects. Recall at 5 minutes: recalls 2 of 3 objects.   Speech: speech is normal   Level of consciousness: alert  Knowledge: good.     CRANIAL NERVES     CN II   Visual fields full to confrontation.     CN III, IV, VI   Pupils are equal, round, and reactive to light.  Extraocular motions are normal.   CN III: no CN III palsy  CN VI: no CN VI palsy  Nystagmus: none   Upgaze: normal  Downgaze: normal    CN V   Facial sensation intact.     CN VII   Facial expression full, symmetric.     CN VIII   CN VIII normal.   Hearing: intact    CN IX, X   CN IX normal.   CN X normal.   Palate: symmetric    CN XI   CN XI normal.   Right sternocleidomastoid strength: normal  Left sternocleidomastoid strength: normal  Right trapezius strength: normal  Left trapezius strength: normal    CN XII   CN XII normal.   Tongue: not atrophic  Fasciculations: absent  Tongue deviation: none    MOTOR EXAM   Muscle bulk: normal  Overall muscle tone: normal    Strength   Strength 5/5 throughout.     REFLEXES     Reflexes   Right brachioradialis: 2+  Left brachioradialis: 2+  Right biceps: 2+  Left biceps: 2+  Right patellar: 2+  Left patellar: 2+  Right achilles: 2+  Left achilles: 2+  Right plantar: equivocal  Left plantar: equivocal    SENSORY EXAM   Light touch normal.   Right arm proprioception: normal  Left arm proprioception:  "normal    GAIT AND COORDINATION      Coordination   Finger to nose coordination: normal    Tremor   Resting tremor: absent  Intention tremor: absent    Significant Labs: All pertinent lab results from the past 24 hours have been reviewed.    Significant Imaging: I have reviewed all pertinent imaging results/findings within the past 24 hours.    Assessment and Plan:     * Transient neurological symptoms  Shaina Dietz is a 32 y.o. F w/ h/o bipolar II d/o (on Lamictal, Trileptal, PRN Klonopin) and self-reported migraine w/ aura for whom Neurology is consulted to evaluate seizure-like activity. Pt reports first experiencing "depersonalization" episodes 2 yrs ago. During these episodes, she stares off into distance, unable to find words, but has a complete recollection of what occurs. Had OP neuro eval in Jan '22; MRI brain and EEG unremarkable at that time. Had 3 staring episodes yesterday. Was nauseated, tearful. Staring that lasted ~2 min. Felt a sense of melissa vu and impending doom and voices were "muffled." Then, pt's eyes rolled back and she began "convulsing" and "flailing" for ~5 min. Reports LOC and tongue-biting but denies incontinence. Pt's lips reportedly became blue. Pt confused, agitated after event. Home meds include Lamictal 400 (missed dose yesterday), Trileptal 750, Klonopin 0.5-1 as needed (last took 4 d ago). Reports taking Benadryl (last 2 d ago). Reports a family h/o juvenile myoclonic epilepsy and CVA in father. Denies recent sleep disturbance, EtOH and substance use, and life stressors.     Given pt's description of dissociative events, these may be panic attacks vs migraines; absence seizures unlikely given pt's age, recollection of events/no LOC. Subsequent episode of LOC and jerking movements yesterday likely psychogenic nonepileptic seizure, considering pt on 2 AEDs, or possibly provoked seizure.     Recommendations:  · Increase Trileptal to 600 mg bid for additional seizure " coverage  · Continue Lamictal 400 mg daily  · Outpatient Neurology follow-up  · No further inpatient neurologic work-up warranted at this time  · Follow up w/ outpatient psychiatrist      VTE Risk Mitigation (From admission, onward)         Ordered     enoxaparin injection 40 mg  Daily         09/22/22 2234     IP VTE HIGH RISK PATIENT  Once         09/22/22 2234     Place sequential compression device  Until discontinued         09/22/22 2234                Thank you for your consult. I will follow-up with patient. Please contact us if you have any additional questions.    Jose L Benito MD  Neurology  [facility] ([facility]

## 2022-09-23 NOTE — HPI
"Shaina Dietz is a 32 y.o. F w/ h/o bipolar II d/o (on Lamictal, Trileptal, PRN Klonopin) and self-reported migraine w/ aura for whom Neurology is consulted to evaluate seizure-like activity. Pt reports first experiencing "depersonalization" episodes 2 yrs ago. During these episodes, she feels tired and stares off into the distance, unable to find words, but has a complete recollection of what occurs. She saw Dr. Camarena in outpt neurology in Jan '22 whose assessment was that events were most likely nonepileptic. MRI brain and EEG were unremarkable at that time. Yesterday, pt had 3 of these staring episodes. According to a description by the pt's partner (who witnessed the event), pt was at a hair salon when she felt nauseated and was "more tearful than normal." She had staring that lasted about ~2 min. According to pt, she felt a sense of melissa vu and impending doom and voices were "muffled" as if underwater at this time. Then, according to partner, pt's eyes rolled back and she began "convulsing" and "flailing" for ~5 min. Pt reports losing consciousness w/ no recollection of these movements. She reports tongue-biting but denies incontinence. Pt's lips reportedly became blue. After the shaking ceased, pt was confused, "agitated," w/ a feeling of "coming out of [her] skin."  She reports feeling drowsy before and after event. Pt's home medications include Lamictal 400 (missed dose yesterday), Trileptal 750 qhs, Klonopin 0.5-1 as needed (last took 4 d ago), Risperdal 0.25 daily, and Seroquel 50 (about once per month "to reset"). Pt reports taking Benadryl (last 2 d ago) and Nyquil (last 1 wk ago). Pt reports a family hx of juvenile myoclonic epilepsy and stroke in her father. Reports a recent URI a couple wks ago but no recent fever. Denies recent sleep disturbance, EtOH, and substance use. Denies recent life stressors and reports good state of mental health, seeing her therapist weekly.   "

## 2022-09-23 NOTE — HOSPITAL COURSE
Pt admitted to [facility], and continued to remain stable overnight and into 9/23. No further seizure activity, stable at baseline. Neurology consulted, and recommended increasing Trileptal to 600mg BID. Pt said she discussed this with her private psychiatrist, who had concerns for medication-induced hyponatremia (per pt, this was an issue in the past). Na stable on AM labs, and pt agreeable to trial of increased Trileptal with close outpatient follow-up with psychiatry and neurology for symptoms and electrolyte derrangements. Continued to remain seizure-free into 9/24 and grossly stable at her baseline. Pt deemed appropriate for discharge. Driving precautions given; states she does not plan to drive, and her girlfriend and family will assist while further outpatient workup ongoing. Plan discussed with pt, who was agreeable and amenable; medications were discussed and reviewed, outpatient follow-up scheduled, ER precautions were given, all questions were answered to the pt's satisfaction, and Ms. Dietz was subsequently discharged.

## 2022-09-23 NOTE — HPI
This is a 31yo female with a past medical history of Bipolar disorder on lamotrigine and trileptal daily who presents to the ED with chief complaint of seizure. Patient currently follows with Psychiatry and Neurology. Beginning about two years ago she developed new onset episodes of 1-2 minute episodes with melissa vu, feeling of impending doom, alterations in hearing (described as feeling like her hearing is underwater), cognitive fog, word-finding difficulty or inability to speak. Episodes reportedly associated with fist clinching and reportedly unable to interact during events. Previously She endorses full recollection of each event with no bowel/bladder incontinence, rhythmic shaking, tongue biting, or uncontrolled movements. She underwent EEG, MRI and Neuro eval for this with unremarkable findings and notably without noted seizure activity.   Today patient reports similar prodrome but then had witnessed generalized shaking with blue change to lips lasting 3-4minutes. Episode witnessed by partner who was present during exam. Patient states that this time she was not aware of episode and had confusion and panic feelings afterwards. She did not have loss of bowel or bladder control. Patient does report missing her home lamotrigine dose yesterday but states otherwise has been compliant.  In the ED patient afebrile and hemodynamically stable saturating well on room air. Patient denies fevers, chills, numbness, confusion, chest pain, shortness of breath, or weakness. Admitted to the care of medicine for further evaluation and management.

## 2022-09-23 NOTE — SUBJECTIVE & OBJECTIVE
Past Medical History:   Diagnosis Date    Acne     Bipolar 2 disorder     Hx of migraine headaches        Past Surgical History:   Procedure Laterality Date    HYSTERECTOMY  October 2018    For fibroids - benign. Removed Uterus, Cervix, and Fallopian tubes; not ovaries.    TONSILLECTOMY  2011       Review of patient's allergies indicates:  No Known Allergies    Current Neurological Medications: lamotrigine 400, Trileptal 900    No current facility-administered medications on file prior to encounter.     Current Outpatient Medications on File Prior to Encounter   Medication Sig    clonazePAM (KLONOPIN) 1 MG tablet Take 0.5-1 mg by mouth daily as needed.    fluticasone propionate (FLONASE) 50 mcg/actuation nasal spray 2 sprays (100 mcg total) by Each Nostril route once daily.    lamoTRIgine (LAMICTAL) 200 MG tablet     OLANZapine (ZYPREXA) 5 MG tablet Take 5 mg by mouth nightly.    OXcarbazepine (TRILEPTAL) 300 MG Tab TK 4 TS PO QHS     Family History       Problem Relation (Age of Onset)    Alcohol abuse Mother, Brother    Cancer Mother, Father, Sister, Paternal Grandmother, Paternal Aunt    Depression Mother, Brother, Sister, Maternal Aunt    Early death Paternal Grandmother, Maternal Aunt    Heart disease Mother    Hypertension Mother    Learning disabilities Brother    Mental illness Mother, Brother, Sister, Maternal Aunt    Stroke Father          Tobacco Use    Smoking status: Former     Packs/day: 0.25     Years: 10.00     Pack years: 2.50     Types: Cigarettes     Start date: 8/1/2009     Quit date: 9/10/2019     Years since quitting: 3.0    Smokeless tobacco: Never   Substance and Sexual Activity    Alcohol use: Not Currently     Alcohol/week: 0.0 standard drinks     Comment: Former binge drinker (9 yrs). Sober 11 months (as of 8/2019)    Drug use: Never    Sexual activity: Yes     Partners: Female     Birth control/protection: Condom, See Surgical Hx, Other-see comments     Comment: Hysterectomy in 2018.      Review of Systems   Constitutional:  Negative for fatigue and fever.   HENT:  Negative for hearing loss and sore throat.    Eyes:  Negative for redness and visual disturbance.   Respiratory:  Negative for cough and shortness of breath.    Cardiovascular:  Negative for chest pain and palpitations.   Gastrointestinal:  Positive for nausea. Negative for constipation, diarrhea and vomiting.   Genitourinary:  Negative for difficulty urinating and dysuria.   Musculoskeletal:  Negative for arthralgias and myalgias.   Skin:  Negative for color change and rash.   Neurological:  Positive for headaches. Negative for dizziness, weakness and numbness.   Psychiatric/Behavioral:  Negative for dysphoric mood and sleep disturbance.    Objective:     Vital Signs (Most Recent):  Temp: 98.4 °F (36.9 °C) (09/23/22 0737)  Pulse: 79 (09/23/22 0737)  Resp: 18 (09/23/22 0737)  BP: (!) 108/59 (09/23/22 0737)  SpO2: 96 % (09/23/22 0737)   Vital Signs (24h Range):  Temp:  [97.4 °F (36.3 °C)-98.5 °F (36.9 °C)] 98.4 °F (36.9 °C)  Pulse:  [] 79  Resp:  [16-18] 18  SpO2:  [96 %-100 %] 96 %  BP: (108-144)/(59-95) 108/59     Weight: 99.8 kg (220 lb)  Body mass index is 36.61 kg/m².    Physical Exam  Eyes:      Extraocular Movements: EOM normal.      Pupils: Pupils are equal, round, and reactive to light.   Neurological:      Mental Status: She is oriented to person, place, and time.      Motor: Motor strength is normal.      Coordination: Finger-Nose-Finger Test normal.      Deep Tendon Reflexes:      Reflex Scores:       Bicep reflexes are 2+ on the right side and 2+ on the left side.       Brachioradialis reflexes are 2+ on the right side and 2+ on the left side.       Patellar reflexes are 2+ on the right side and 2+ on the left side.       Achilles reflexes are 2+ on the right side and 2+ on the left side.  Psychiatric:         Speech: Speech normal.       NEUROLOGICAL EXAMINATION:     MENTAL STATUS   Oriented to person, place, and  time.   Registration: recalls 3 of 3 objects. Recall at 5 minutes: recalls 2 of 3 objects.   Speech: speech is normal   Level of consciousness: alert  Knowledge: good.     CRANIAL NERVES     CN II   Visual fields full to confrontation.     CN III, IV, VI   Pupils are equal, round, and reactive to light.  Extraocular motions are normal.   CN III: no CN III palsy  CN VI: no CN VI palsy  Nystagmus: none   Upgaze: normal  Downgaze: normal    CN V   Facial sensation intact.     CN VII   Facial expression full, symmetric.     CN VIII   CN VIII normal.   Hearing: intact    CN IX, X   CN IX normal.   CN X normal.   Palate: symmetric    CN XI   CN XI normal.   Right sternocleidomastoid strength: normal  Left sternocleidomastoid strength: normal  Right trapezius strength: normal  Left trapezius strength: normal    CN XII   CN XII normal.   Tongue: not atrophic  Fasciculations: absent  Tongue deviation: none    MOTOR EXAM   Muscle bulk: normal  Overall muscle tone: normal    Strength   Strength 5/5 throughout.     REFLEXES     Reflexes   Right brachioradialis: 2+  Left brachioradialis: 2+  Right biceps: 2+  Left biceps: 2+  Right patellar: 2+  Left patellar: 2+  Right achilles: 2+  Left achilles: 2+  Right plantar: equivocal  Left plantar: equivocal    SENSORY EXAM   Light touch normal.   Right arm proprioception: normal  Left arm proprioception: normal    GAIT AND COORDINATION      Coordination   Finger to nose coordination: normal    Tremor   Resting tremor: absent  Intention tremor: absent    Significant Labs: All pertinent lab results from the past 24 hours have been reviewed.    Significant Imaging: I have reviewed all pertinent imaging results/findings within the past 24 hours.

## 2022-09-23 NOTE — NURSING
PT SPOKE WITH PSYCH MD, STATED INCREASES IN LAMICTAL DOSAGES MAY CAUSE LOW SODIUM LEVELS AND INTERMITTENT CONFUSION, MD MADE AWARE, SEE NEW ORDERS

## 2022-09-23 NOTE — ED NOTES
Bed: [facility] 04  Expected date:   Expected time:   Means of arrival:   Comments:

## 2022-09-23 NOTE — NURSING
Patient arrived to the unit from the ED via transport aaox4, vitals wnl, afebrile, on room air. No acute distress noted at this time. No c/o pain currently. Patient independent with ambulation and ADLs. Patient and spouse oriented to room and call light system. Bed padded for seizure precautions and placed in lowest position. All needs/concerns addressed at this time. Seizure precautions maintained.

## 2022-09-23 NOTE — SUBJECTIVE & OBJECTIVE
Interval History: Pt seen and examined this morning on maru REYNA. Grossly stable, remains at baseline. Updated on EEG and neurology consult. Care plan reviewed. Otherwise, doing well and with no further complaints at this time.      Objective:     Vital Signs (Most Recent):  Temp: 98.7 °F (37.1 °C) (09/23/22 1117)  Pulse: 83 (09/23/22 1117)  Resp: 18 (09/23/22 1117)  BP: 133/83 (09/23/22 1117)  SpO2: 96 % (09/23/22 1117) Vital Signs (24h Range):  Temp:  [97.4 °F (36.3 °C)-98.7 °F (37.1 °C)] 98.7 °F (37.1 °C)  Pulse:  [] 83  Resp:  [16-18] 18  SpO2:  [96 %-100 %] 96 %  BP: (108-144)/(59-95) 133/83     Weight: 99.8 kg (220 lb)  Body mass index is 36.61 kg/m².    Intake/Output Summary (Last 24 hours) at 9/23/2022 1308  Last data filed at 9/22/2022 2304  Gross per 24 hour   Intake 1000 ml   Output --   Net 1000 ml        Physical Exam  Gen: in NAD, appears stated age; pt is obese  Neuro: AAOx4, CN2-12 grossly intact BL; motor, sensory, and strength grossly intact BL  HEENT: NTNC, EOMI, PERRLA, MMM; no thyromegaly or lymphadenopathy; no JVD appreciated  CVS: RRR, no m/r/g; S1/S2 auscultated with no S3 or S4; capillary refill < 2 sec  Resp: lungs CTAB, no w/r/r; no belabored breathing or accessory muscle use appreciated   Abd: BS+ in all 4 quadrants; NTND, soft to palpation; no organomegaly appreciated   Extrem: pulses full, equal, and regular over all 4 extremities; no UE or LE edema BL      Significant Labs: All pertinent labs within the past 24 hours have been reviewed.    Significant Imaging: I have reviewed all pertinent imaging results/findings within the past 24 hours.

## 2022-09-23 NOTE — H&P
[facility] - Emergency Dept  [facility]  History & Physical    Patient Name: Shaina Dietz  MRN: [id number]  Patient Class: OP- Observation  Admission Date: 9/22/2022  Attending Physician: Jama Lynch MD   Primary Care Provider: Neil Escalante MD         Patient information was obtained from patient, past medical records and ER records.     Subjective:     Principal Problem:Transient neurological symptoms    Chief Complaint:   Chief Complaint   Patient presents with    Seizures     Tonic-clonic seizure lasted 5 minutes, missed one dose of lamotrigine yest. On seizure meds (Trileptal and Lamotrigine) for bipolar disorder. Hx of absent Sz in the past but unsure as to why she has these episodes. Pt bit her tongue, unsure if she voided on herself. Fully oriented upon arrival to the ED.        HPI: This is a 33yo female with a past medical history of Bipolar disorder on lamotrigine and trileptal daily who presents to the ED with chief complaint of seizure. Patient currently follows with Psychiatry and Neurology. Beginning about two years ago she developed new onset episodes of 1-2 minute episodes with melissa vu, feeling of impending doom, alterations in hearing (described as feeling like her hearing is underwater), cognitive fog, word-finding difficulty or inability to speak. Episodes reportedly associated with fist clinching and reportedly unable to interact during events. Previously She endorses full recollection of each event with no bowel/bladder incontinence, rhythmic shaking, tongue biting, or uncontrolled movements. She underwent EEG, MRI and Neuro eval for this with unremarkable findings and notably without noted seizure activity.   Today patient reports similar prodrome but then had witnessed generalized shaking with blue change to lips lasting 3-4minutes. Episode witnessed by partner who was present during exam. Patient states that this time she was not aware of episode and had confusion and panic  feelings afterwards. She did not have loss of bowel or bladder control. Patient does report missing her home lamotrigine dose yesterday but states otherwise has been compliant.  In the ED patient afebrile and hemodynamically stable saturating well on room air. Patient denies fevers, chills, numbness, confusion, chest pain, shortness of breath, or weakness. Admitted to the care of medicine for further evaluation and management.      Past Medical History:   Diagnosis Date    Acne     Bipolar 2 disorder     Hx of migraine headaches        Past Surgical History:   Procedure Laterality Date    HYSTERECTOMY  October 2018    For fibroids - benign. Removed Uterus, Cervix, and Fallopian tubes; not ovaries.    TONSILLECTOMY  2011       Review of patient's allergies indicates:  No Known Allergies    No current facility-administered medications on file prior to encounter.     Current Outpatient Medications on File Prior to Encounter   Medication Sig    clonazePAM (KLONOPIN) 1 MG tablet Take 0.5-1 mg by mouth daily as needed.    fluticasone propionate (FLONASE) 50 mcg/actuation nasal spray 2 sprays (100 mcg total) by Each Nostril route once daily.    lamoTRIgine (LAMICTAL) 200 MG tablet     OLANZapine (ZYPREXA) 5 MG tablet Take 5 mg by mouth nightly.    OXcarbazepine (TRILEPTAL) 300 MG Tab TK 4 TS PO QHS     Family History       Problem Relation (Age of Onset)    Alcohol abuse Mother, Brother    Cancer Mother, Father, Sister, Paternal Grandmother, Paternal Aunt    Depression Mother, Brother, Sister, Maternal Aunt    Early death Paternal Grandmother, Maternal Aunt    Heart disease Mother    Hypertension Mother    Learning disabilities Brother    Mental illness Mother, Brother, Sister, Maternal Aunt    Stroke Father          Tobacco Use    Smoking status: Former     Packs/day: 0.25     Years: 10.00     Pack years: 2.50     Types: Cigarettes     Start date: 8/1/2009     Quit date: 9/10/2019     Years since quitting: 3.0     Smokeless tobacco: Never   Substance and Sexual Activity    Alcohol use: Not Currently     Alcohol/week: 0.0 standard drinks     Comment: Former binge drinker (9 yrs). Sober 11 months (as of 8/2019)    Drug use: Never    Sexual activity: Yes     Partners: Female     Birth control/protection: Condom, See Surgical Hx, Other-see comments     Comment: Hysterectomy in 2018.     Review of Systems   Constitutional:  Negative for chills, fatigue and fever.   HENT:  Negative for sore throat and trouble swallowing.    Eyes:  Negative for photophobia and visual disturbance.   Respiratory:  Negative for cough, shortness of breath and wheezing.    Cardiovascular:  Negative for chest pain, palpitations and leg swelling.   Gastrointestinal:  Negative for abdominal distention, abdominal pain, diarrhea, nausea and vomiting.   Genitourinary:  Negative for dysuria and hematuria.   Musculoskeletal:  Negative for neck pain and neck stiffness.   Skin:  Negative for rash and wound.   Neurological:  Positive for seizures and headaches. Negative for dizziness, syncope, speech difficulty, weakness and numbness.   Psychiatric/Behavioral:  Negative for confusion and decreased concentration.    Objective:     Vital Signs (Most Recent):  Temp: 97.4 °F (36.3 °C) (09/22/22 2128)  Pulse: (!) 113 (09/22/22 2128)  Resp: 16 (09/22/22 2128)  BP: (!) 141/73 (09/22/22 2128)  SpO2: 96 % (09/22/22 2128)   Vital Signs (24h Range):  Temp:  [97.4 °F (36.3 °C)-98.5 °F (36.9 °C)] 97.4 °F (36.3 °C)  Pulse:  [113-130] 113  Resp:  [16-18] 16  SpO2:  [96 %-100 %] 96 %  BP: (120-141)/(70-73) 141/73     Weight: 99.8 kg (220 lb)  Body mass index is 36.61 kg/m².    Physical Exam  Constitutional:       General: She is not in acute distress.     Appearance: She is not toxic-appearing or diaphoretic.   HENT:      Head: Normocephalic and atraumatic.      Nose: Nose normal.   Eyes:      General: No scleral icterus.     Extraocular Movements: Extraocular movements  intact.      Pupils: Pupils are equal, round, and reactive to light.   Cardiovascular:      Rate and Rhythm: Normal rate and regular rhythm.   Pulmonary:      Effort: Pulmonary effort is normal. No respiratory distress.      Breath sounds: No wheezing or rales.   Abdominal:      General: Abdomen is flat. There is no distension.      Palpations: Abdomen is soft.      Tenderness: There is no abdominal tenderness. There is no guarding.   Musculoskeletal:         General: Normal range of motion.      Cervical back: Normal range of motion and neck supple. No rigidity.      Right lower leg: No edema.      Left lower leg: No edema.   Skin:     General: Skin is warm and dry.      Coloration: Skin is not jaundiced.   Neurological:      General: No focal deficit present.      Mental Status: She is alert and oriented to person, place, and time.      Cranial Nerves: No cranial nerve deficit.   Psychiatric:         Mood and Affect: Mood normal.         Behavior: Behavior normal.         CRANIAL NERVES     CN III, IV, VI   Pupils are equal, round, and reactive to light.     Significant Labs: All pertinent labs within the past 24 hours have been reviewed.  CBC:   Recent Labs   Lab 09/22/22 1953   WBC 11.31   HGB 14.5   HCT 39.7        CMP:   Recent Labs   Lab 09/22/22 1953   *   K 4.3      CO2 21*   GLU 93   BUN 13   CREATININE 0.9   CALCIUM 9.4   PROT 7.6   ALBUMIN 4.0   BILITOT 0.2   ALKPHOS 79   AST 25   ALT 14   ANIONGAP 9     Urine Studies:   Recent Labs   Lab 09/22/22 2026   COLORU Yellow   APPEARANCEUA Clear   PHUR 6.0   SPECGRAV 1.025   PROTEINUA 1+*   GLUCUA Negative   KETONESU 1+*   BILIRUBINUA Negative   OCCULTUA 2+*   NITRITE Negative   LEUKOCYTESUR Negative   RBCUA 3   WBCUA 2   BACTERIA Occasional   SQUAMEPITHEL 1   HYALINECASTS 3*       Significant Imaging: I have reviewed all pertinent imaging results/findings within the past 24 hours.    Assessment/Plan:     * Transient neurological  symptoms  - Unclear etiology though presenting episode more concerning for seizure vs prior given no recollection of events and witnessed generalized shaking with cyanotic skin change   ;   Possibly seizure induced by hyperventilation/hypoventilation during pychogenic episode  - continue home lamotrigine and trileptal  - lamotrigine level pending  - seizure precautions  - consider Neurology consult in am pending clinical course and future study review      Bipolar 2 disorder  - continue home Lamotrigine and trileptal  - serum lamotrigine level pending  - consider IP Psych eval vs OP follow up         VTE Risk Mitigation (From admission, onward)         Ordered     enoxaparin injection 40 mg  Daily         09/22/22 2234     IP VTE HIGH RISK PATIENT  Once         09/22/22 2234     Place sequential compression device  Until discontinued         09/22/22 2234                   Manuel Rdz MD  Department of Hospital Medicine   [facility] Dept

## 2022-09-23 NOTE — ASSESSMENT & PLAN NOTE
- Unclear etiology though presenting episode more concerning for seizure vs prior given no recollection of events and witnessed generalized shaking with cyanotic skin change   ;   Possibly seizure induced by hyperventilation/hypoventilation during pychogenic episode  - continue home lamotrigine and trileptal  - lamotrigine level pending  - seizure precautions  - consider Neurology consult in am pending clinical course and future study review

## 2022-09-23 NOTE — ASSESSMENT & PLAN NOTE
- continue home Lamotrigine and trileptal  - serum lamotrigine level pending  - consider IP Psych eval vs OP follow up

## 2022-09-23 NOTE — ASSESSMENT & PLAN NOTE
- Interval history and physical exam findings as described above  - Unclear etiology though presenting episode more concerning for seizure vs prior given no recollection of events and witnessed generalized shaking with cyanotic skin change   ;   Possibly seizure induced by hyperventilation/hypoventilation during pychogenic episode  - continue home lamotrigine and trileptal  - EEG ordered  - Neurology consulted, appreciate recs  - lamotrigine level pending  - seizure precautions  - Closely monitoring

## 2022-09-23 NOTE — SUBJECTIVE & OBJECTIVE
Past Medical History:   Diagnosis Date    Acne     Bipolar 2 disorder     Hx of migraine headaches        Past Surgical History:   Procedure Laterality Date    HYSTERECTOMY  October 2018    For fibroids - benign. Removed Uterus, Cervix, and Fallopian tubes; not ovaries.    TONSILLECTOMY  2011       Review of patient's allergies indicates:  No Known Allergies    No current facility-administered medications on file prior to encounter.     Current Outpatient Medications on File Prior to Encounter   Medication Sig    clonazePAM (KLONOPIN) 1 MG tablet Take 0.5-1 mg by mouth daily as needed.    fluticasone propionate (FLONASE) 50 mcg/actuation nasal spray 2 sprays (100 mcg total) by Each Nostril route once daily.    lamoTRIgine (LAMICTAL) 200 MG tablet     OLANZapine (ZYPREXA) 5 MG tablet Take 5 mg by mouth nightly.    OXcarbazepine (TRILEPTAL) 300 MG Tab TK 4 TS PO QHS     Family History       Problem Relation (Age of Onset)    Alcohol abuse Mother, Brother    Cancer Mother, Father, Sister, Paternal Grandmother, Paternal Aunt    Depression Mother, Brother, Sister, Maternal Aunt    Early death Paternal Grandmother, Maternal Aunt    Heart disease Mother    Hypertension Mother    Learning disabilities Brother    Mental illness Mother, Brother, Sister, Maternal Aunt    Stroke Father          Tobacco Use    Smoking status: Former     Packs/day: 0.25     Years: 10.00     Pack years: 2.50     Types: Cigarettes     Start date: 8/1/2009     Quit date: 9/10/2019     Years since quitting: 3.0    Smokeless tobacco: Never   Substance and Sexual Activity    Alcohol use: Not Currently     Alcohol/week: 0.0 standard drinks     Comment: Former binge drinker (9 yrs). Sober 11 months (as of 8/2019)    Drug use: Never    Sexual activity: Yes     Partners: Female     Birth control/protection: Condom, See Surgical Hx, Other-see comments     Comment: Hysterectomy in 2018.     Review of Systems   Constitutional:  Negative for chills, fatigue  and fever.   HENT:  Negative for sore throat and trouble swallowing.    Eyes:  Negative for photophobia and visual disturbance.   Respiratory:  Negative for cough, shortness of breath and wheezing.    Cardiovascular:  Negative for chest pain, palpitations and leg swelling.   Gastrointestinal:  Negative for abdominal distention, abdominal pain, diarrhea, nausea and vomiting.   Genitourinary:  Negative for dysuria and hematuria.   Musculoskeletal:  Negative for neck pain and neck stiffness.   Skin:  Negative for rash and wound.   Neurological:  Positive for seizures and headaches. Negative for dizziness, syncope, speech difficulty, weakness and numbness.   Psychiatric/Behavioral:  Negative for confusion and decreased concentration.    Objective:     Vital Signs (Most Recent):  Temp: 97.4 °F (36.3 °C) (09/22/22 2128)  Pulse: (!) 113 (09/22/22 2128)  Resp: 16 (09/22/22 2128)  BP: (!) 141/73 (09/22/22 2128)  SpO2: 96 % (09/22/22 2128)   Vital Signs (24h Range):  Temp:  [97.4 °F (36.3 °C)-98.5 °F (36.9 °C)] 97.4 °F (36.3 °C)  Pulse:  [113-130] 113  Resp:  [16-18] 16  SpO2:  [96 %-100 %] 96 %  BP: (120-141)/(70-73) 141/73     Weight: 99.8 kg (220 lb)  Body mass index is 36.61 kg/m².    Physical Exam  Constitutional:       General: She is not in acute distress.     Appearance: She is not toxic-appearing or diaphoretic.   HENT:      Head: Normocephalic and atraumatic.      Nose: Nose normal.   Eyes:      General: No scleral icterus.     Extraocular Movements: Extraocular movements intact.      Pupils: Pupils are equal, round, and reactive to light.   Cardiovascular:      Rate and Rhythm: Normal rate and regular rhythm.   Pulmonary:      Effort: Pulmonary effort is normal. No respiratory distress.      Breath sounds: No wheezing or rales.   Abdominal:      General: Abdomen is flat. There is no distension.      Palpations: Abdomen is soft.      Tenderness: There is no abdominal tenderness. There is no guarding.    Musculoskeletal:         General: Normal range of motion.      Cervical back: Normal range of motion and neck supple. No rigidity.      Right lower leg: No edema.      Left lower leg: No edema.   Skin:     General: Skin is warm and dry.      Coloration: Skin is not jaundiced.   Neurological:      General: No focal deficit present.      Mental Status: She is alert and oriented to person, place, and time.      Cranial Nerves: No cranial nerve deficit.   Psychiatric:         Mood and Affect: Mood normal.         Behavior: Behavior normal.         CRANIAL NERVES     CN III, IV, VI   Pupils are equal, round, and reactive to light.     Significant Labs: All pertinent labs within the past 24 hours have been reviewed.  CBC:   Recent Labs   Lab 09/22/22 1953   WBC 11.31   HGB 14.5   HCT 39.7        CMP:   Recent Labs   Lab 09/22/22 1953   *   K 4.3      CO2 21*   GLU 93   BUN 13   CREATININE 0.9   CALCIUM 9.4   PROT 7.6   ALBUMIN 4.0   BILITOT 0.2   ALKPHOS 79   AST 25   ALT 14   ANIONGAP 9     Urine Studies:   Recent Labs   Lab 09/22/22 2026   COLORU Yellow   APPEARANCEUA Clear   PHUR 6.0   SPECGRAV 1.025   PROTEINUA 1+*   GLUCUA Negative   KETONESU 1+*   BILIRUBINUA Negative   OCCULTUA 2+*   NITRITE Negative   LEUKOCYTESUR Negative   RBCUA 3   WBCUA 2   BACTERIA Occasional   SQUAMEPITHEL 1   HYALINECASTS 3*       Significant Imaging: I have reviewed all pertinent imaging results/findings within the past 24 hours.

## 2022-09-23 NOTE — ASSESSMENT & PLAN NOTE
- Body mass index is 36.61 kg/m².  - Needs dietary and lifestyle modifications in relation to health  - Consider dietary/nutrition consult outpatient

## 2022-09-23 NOTE — MEDICAL/APP STUDENT
Subjective:      Patient ID: Shaina Dietz is a 32 y.o. female.    Chief Complaint: seizure like activity     HPI  Ms. Dietz is a 33 y/o female, with PMH of bipolar disorder II (takes Lamictal, Trileptal, Risperidone and Clonazepam), migraines, transient neurological symptoms (Sees Dr. Polanco as outpatient) and [facility] [place]. Admitted for seizure like activity. Yesterday she presented an episode of loss of consciousness associated with generalized tonic clonic movements, eyes rolled back, bit her tongue, was breathing erratically and lips turned blue, lasted 5 minutes and was witnessed by her partner and other people. Prodrome of typical events of dissociation that have been happening for 2 years, numbness in my body accompanied by a melissa vu, feeling impending doom, starring w/o being able to find the words to respond, everyone's voices sound muffled, like underwater and postictal with confusion and amnesia of the event, didnt present bowel or urinary incontinence.  She states that before the event, she presented 2 more episodes of dissociation, and a mild headache, skipped a dose of Lamictal, was not sleep deprived, denies any illness or acute stress events in her life. States that the last time she had her clonazepam was 4 days before, not use of alcohol of recreational substances. For the note, patient sees Neurology for dissociation events, last note: Normal imaging and EEG along with therapeutic doses of 2 antiepileptics decrease the likelihood that these recent onset events are epileptic in origin however this cannot be completely ruled out.     Review of Systems   Constitutional:  Negative for activity change, appetite change, chills and fever.   HENT:  Negative for nasal congestion, hearing loss and sore throat.    Eyes:  Negative for photophobia.   Respiratory:  Negative for cough, chest tightness and shortness of breath.    Cardiovascular:  Negative for chest pain.   Gastrointestinal:  Negative  "for abdominal pain, constipation and diarrhea.   Neurological:  Positive for numbness (Associated with the "dissociation events"), headaches (Presented a mild headache the day of the event) and memory loss (Has noticed some memory issues that she says "it might be due to the trileptal"). Negative for seizures and syncope.   Psychiatric/Behavioral:  Negative for dysphoric mood and sleep disturbance. The patient is not nervous/anxious.         States that hasn't been under stressful situations, recently came from vacation      Objective:      Physical Exam  Constitutional:       General: She is not in acute distress.     Appearance: Normal appearance. She is not ill-appearing.   HENT:      Head: Normocephalic and atraumatic.   Eyes:      Extraocular Movements: Extraocular movements intact and EOM normal.      Pupils: Pupils are equal, round, and reactive to light.   Pulmonary:      Effort: Pulmonary effort is normal.   Abdominal:      Palpations: Abdomen is soft.   Musculoskeletal:         General: Normal range of motion.      Cervical back: Normal range of motion.   Skin:     General: Skin is warm.   Neurological:      Mental Status: She is alert.      Motor: Motor strength is normal.      Coordination: Finger-Nose-Finger Test normal.      Deep Tendon Reflexes:      Reflex Scores:       Bicep reflexes are 2+ on the right side and 2+ on the left side.       Brachioradialis reflexes are 2+ on the right side and 2+ on the left side.       Patellar reflexes are 2+ on the right side and 2+ on the left side.       Achilles reflexes are 2+ on the right side and 2+ on the left side.      NEUROLOGICAL EXAMINATION:     MENTAL STATUS   Oriented to person.   Oriented to place.   Oriented to time.   Registration: recalls 3 of 3 objects. Recall at 5 minutes: recalls 2 of 3 objects. Follows 3 step commands.   Attention: normal. Concentration: normal.   Level of consciousness: alert  Knowledge: good.   Normal comprehension. " "    CRANIAL NERVES     CN II   Visual fields full to confrontation.     CN III, IV, VI   Pupils are equal, round, and reactive to light.  Extraocular motions are normal.   Right pupil: Size: 3 mm. Shape: regular. Reactivity: brisk. Accommodation: intact.   Left pupil: Size: 3 mm. Shape: regular. Reactivity: brisk. Accommodation: intact.   CN III: no CN III palsy  CN VI: no CN VI palsy  Nystagmus: none     CN V   Facial sensation intact.     CN VII   Facial expression full, symmetric.     CN VIII   CN VIII normal.     CN IX, X   CN IX normal.   CN X normal.     CN XI   CN XI normal.     CN XII   CN XII normal.     MOTOR EXAM   Muscle bulk: normal  Overall muscle tone: normal    Strength   Strength 5/5 throughout.     REFLEXES     Reflexes   Right brachioradialis: 2+  Left brachioradialis: 2+  Right biceps: 2+  Left biceps: 2+  Right patellar: 2+  Left patellar: 2+  Right achilles: 2+  Left achilles: 2+  Right plantar: normal  Left plantar: normal    SENSORY EXAM   Light touch normal.     GAIT AND COORDINATION      Coordination   Finger to nose coordination: normal    Tremor   Resting tremor: absent  Intention tremor: absent  Action tremor: absent    Assessment:      31 y/o female, with PMH of bipolar disorder II (takes Lamictal, Trileptal, Risperidone and Clonazepam), migraines, transient neurological symptoms (Sees Dr. Polanco as outpatient) and [facility] [place]. Admitted for "seizures. Per Hx, event of GCT movements are seizure activity likely, however the fact that she is already in 2 AEDS (compliant), prior EEG and MRI are normal and the episode was preceded by a possible psychogenic event points PNES as a diagnostic possibility as well. The transient neurological symptoms are compatible with panic attacks that could also lead to syncope and "seizure like activity". Considering an extended EEG and follow up with neurology as an outpatient, by the time there is no need to modify AEDS dose.     Problem List Items " Addressed This Visit    None  Visit Diagnoses       Seizure        Relevant Orders    EKG 12-lead (Completed)    Chest pain        Relevant Orders    EKG 12-lead              Plan:       - EEG for 24 hours.   - No adjustment in AEDS per moment.   - Follow up as an outpatient with Neurology.     ***

## 2022-09-23 NOTE — PLAN OF CARE
[facility] - [facility] ([place] [address])  Initial Discharge Assessment       Primary Care Provider: Neil Escalante MD    Admission Diagnosis: Seizure [R56.9]  Chest pain [R07.9]    Admission Date: 9/22/2022  Expected Discharge Date: 9/24/2022    Discharge Barriers Identified: (P) None    Payor: [employer] [place] [employer] / Plan: [employer] [place] [employer] CHO[employer] / Product Type: Commercial /     Extended Emergency Contact Information  Primary Emergency Contact: Baylee Loredo  Address: [address] [place] [address] [facility]  Home Phone: [phone]  Relation: Significant other  Secondary Emergency Contact: Kevin Dietz   [facility]  Home Phone: [phone]  Relation: Father    Discharge Plan A: (P) Home         [id number] [place] - [address]  [address]  [place] [address]  Phone: [phone] Fax: [phone]    [facility] [id number] [facility] [place]  [address] [facility] [address]  Phone: [phone] Fax: [phone]      Initial Assessment (most recent)       Adult Discharge Assessment - 09/23/22 1120          Discharge Assessment    Assessment Type Discharge Planning Assessment     Confirmed/corrected address, phone number and insurance Yes     Confirmed Demographics Correct on Facesheet     Source of Information patient     Reason For Admission SEIZURE     Lives With significant other     Facility Arrived From: HOME     Do you expect to return to your current living situation? Yes     Do you have help at home or someone to help you manage your care at home? Yes     Who are your caregiver(s) and their phone number(s)? SO JIMMIE LOREDO (P)      Prior to hospitilization cognitive status: Alert/Oriented (P)      Current cognitive status: Alert/Oriented (P)      Walking or Climbing Stairs Difficulty none (P)      Dressing/Bathing Difficulty none (P)      Equipment Currently Used at Home none (P)       Readmission within 30 days? No (P)      Patient currently being followed by outpatient case management? No (P)      Do you currently have service(s) that help you manage your care at home? No (P)      Do you take prescription medications? Yes (P)      Do you have prescription coverage? Yes (P)      Coverage [facility] (P)      Do you have any problems affording any of your prescribed medications? No (P)      Is the patient taking medications as prescribed? yes (P)      Who is going to help you get home at discharge? SO (P)      How do you get to doctors appointments? car, drives self (P)      Are you on dialysis? No (P)      Do you take coumadin? No (P)      Discharge Plan A Home (P)      DME Needed Upon Discharge  none (P)      Discharge Plan discussed with: Patient (P)      Discharge Barriers Identified None (P)         Relationship/Environment    Name(s) of Who Lives With Patient SO (P)

## 2022-09-23 NOTE — PROGRESS NOTES
[facility] ([address] Medicine  Progress Note    Patient Name: Shaina Dietz  MRN: [id number]  Patient Class: OP- Observation   Admission Date: 9/22/2022  Length of Stay: 0 days  Attending Physician: Jama Lynch MD  Primary Care Provider: Neil Escalante MD        Subjective:     Principal Problem:Transient neurological symptoms        HPI:  This is a 31yo female with a past medical history of Bipolar disorder on lamotrigine and trileptal daily who presents to the ED with chief complaint of seizure. Patient currently follows with Psychiatry and Neurology. Beginning about two years ago she developed new onset episodes of 1-2 minute episodes with melissa vu, feeling of impending doom, alterations in hearing (described as feeling like her hearing is underwater), cognitive fog, word-finding difficulty or inability to speak. Episodes reportedly associated with fist clinching and reportedly unable to interact during events. Previously She endorses full recollection of each event with no bowel/bladder incontinence, rhythmic shaking, tongue biting, or uncontrolled movements. She underwent EEG, MRI and Neuro eval for this with unremarkable findings and notably without noted seizure activity.   Today patient reports similar prodrome but then had witnessed generalized shaking with blue change to lips lasting 3-4minutes. Episode witnessed by partner who was present during exam. Patient states that this time she was not aware of episode and had confusion and panic feelings afterwards. She did not have loss of bowel or bladder control. Patient does report missing her home lamotrigine dose yesterday but states otherwise has been compliant.  In the ED patient afebrile and hemodynamically stable saturating well on room air. Patient denies fevers, chills, numbness, confusion, chest pain, shortness of breath, or weakness. Admitted to the care of medicine for further evaluation and  management.      Overview/Hospital Course:  Pt admitted to [facility], and continued to remain stable overnight and into 9/23. No further seizure activity, stable at baseline. EEG ordered and neurology consulted for recommendations.      Interval History: Pt seen and examined this morning on rounds. AXEL. Grossly stable, remains at baseline. Updated on EEG and neurology consult. Care plan reviewed. Otherwise, doing well and with no further complaints at this time.      Objective:     Vital Signs (Most Recent):  Temp: 98.7 °F (37.1 °C) (09/23/22 1117)  Pulse: 83 (09/23/22 1117)  Resp: 18 (09/23/22 1117)  BP: 133/83 (09/23/22 1117)  SpO2: 96 % (09/23/22 1117) Vital Signs (24h Range):  Temp:  [97.4 °F (36.3 °C)-98.7 °F (37.1 °C)] 98.7 °F (37.1 °C)  Pulse:  [] 83  Resp:  [16-18] 18  SpO2:  [96 %-100 %] 96 %  BP: (108-144)/(59-95) 133/83     Weight: 99.8 kg (220 lb)  Body mass index is 36.61 kg/m².    Intake/Output Summary (Last 24 hours) at 9/23/2022 1308  Last data filed at 9/22/2022 2304  Gross per 24 hour   Intake 1000 ml   Output --   Net 1000 ml        Physical Exam  Gen: in NAD, appears stated age; pt is obese  Neuro: AAOx4, CN2-12 grossly intact BL; motor, sensory, and strength grossly intact BL  HEENT: NTNC, EOMI, PERRLA, MMM; no thyromegaly or lymphadenopathy; no JVD appreciated  CVS: RRR, no m/r/g; S1/S2 auscultated with no S3 or S4; capillary refill < 2 sec  Resp: lungs CTAB, no w/r/r; no belabored breathing or accessory muscle use appreciated   Abd: BS+ in all 4 quadrants; NTND, soft to palpation; no organomegaly appreciated   Extrem: pulses full, equal, and regular over all 4 extremities; no UE or LE edema BL      Significant Labs: All pertinent labs within the past 24 hours have been reviewed.    Significant Imaging: I have reviewed all pertinent imaging results/findings within the past 24 hours.      Assessment/Plan:      * Transient neurological symptoms  - Interval history and physical exam findings as  described above  - Unclear etiology though presenting episode more concerning for seizure vs prior given no recollection of events and witnessed generalized shaking with cyanotic skin change   ;   Possibly seizure induced by hyperventilation/hypoventilation during pychogenic episode  - continue home lamotrigine and trileptal  - EEG ordered  - Neurology consulted, appreciate recs  - lamotrigine level pending  - seizure precautions  - Closely monitoring    Bipolar 2 disorder  - continue home Lamotrigine and trileptal  - serum lamotrigine level pending  - consider IP Psych eval vs OP follow up     Class 2 obesity due to excess calories without serious comorbidity with body mass index (BMI) of 36.0 to 36.9 in adult  - Body mass index is 36.61 kg/m².  - Needs dietary and lifestyle modifications in relation to health  - Consider dietary/nutrition consult outpatient      VTE Risk Mitigation (From admission, onward)         Ordered     enoxaparin injection 40 mg  Daily         09/22/22 2234     IP VTE HIGH RISK PATIENT  Once         09/22/22 2234     Place sequential compression device  Until discontinued         09/22/22 2234                Discharge Planning   ANGELIA: 9/24/2022     Code Status: Full Code   Is the patient medically ready for discharge?: No    Reason for patient still in hospital (select all that apply): Patient trending condition  Discharge Plan A: Home            Jama Lynch MD  Attending Physician  Department of Hospital Medicine  Epic secure chat preferred, or ext. [phone]  9/23/2022

## 2022-09-23 NOTE — ED PROVIDER NOTES
Encounter Date: 9/22/2022       History     Chief Complaint   Patient presents with    Seizures     Tonic-clonic seizure lasted 5 minutes, missed one dose of lamotrigine yest. On seizure meds (Trileptal and Lamotrigine) for bipolar disorder. Hx of absent Sz in the past but unsure as to why she has these episodes. Pt bit her tongue, unsure if she voided on herself. Fully oriented upon arrival to the ED.     32 y.o. female with a Bipolar 2 disorder (on Lamotrigine and Trileptal) presents for seizure like activity. The patient reports LOC w/out injury during the event, and her partner who is present states pt was exhibiting rigid jerking of upper and lower extremities with tongue biting and transient blue lips. The patient reports right before the event she remembers feeling dissociated from herself with the same feeling following afterwards.  She also states over the last two years experiencing similar episodes of  feeling dissociated from herself, staring into space not responsive to her surrounding. She was seen by a neurologist due to these symptoms but at the time was not diagnosed with a seizure disorder. She is compliant with her bipolar medication, but states she missed a dose yesterday. She denies fever, shortness of breath, chest pain, weakness of urinary changes.         Review of patient's allergies indicates:  No Known Allergies  Past Medical History:   Diagnosis Date    Acne     Bipolar 2 disorder     Hx of migraine headaches      Past Surgical History:   Procedure Laterality Date    HYSTERECTOMY  October 2018    For fibroids - benign. Removed Uterus, Cervix, and Fallopian tubes; not ovaries.    TONSILLECTOMY  2011     Family History   Problem Relation Age of Onset    Alcohol abuse Mother     Cancer Mother         Breast Cancer (onset 55 y.o.)    Depression Mother     Hypertension Mother     Mental illness Mother     Heart disease Mother         heart problem requiring pacemaker    Cancer  Father         Non-Hodgkins Lymphoma (onset 65 y.o.)    Stroke Father     Alcohol abuse Brother     Depression Brother     Learning disabilities Brother         ADHD    Mental illness Brother     Cancer Sister         Thyroid Cancer (onset 19 y.o.)    Depression Sister     Mental illness Sister     Cancer Paternal Grandmother         Metastatic Cervical Cancer    Early death Paternal Grandmother         Cervical Cancer    Depression Maternal Aunt     Early death Maternal Aunt         Drug Overdose    Mental illness Maternal Aunt     Cancer Paternal Aunt         breast cancer    Melanoma Neg Hx      Social History     Tobacco Use    Smoking status: Former     Packs/day: 0.25     Years: 10.00     Pack years: 2.50     Types: Cigarettes     Start date: 8/1/2009     Quit date: 9/10/2019     Years since quitting: 3.0    Smokeless tobacco: Never   Substance Use Topics    Alcohol use: Not Currently     Alcohol/week: 0.0 standard drinks     Comment: Former binge drinker (9 yrs). Sober 11 months (as of 8/2019)    Drug use: Never     Review of Systems   Constitutional:  Negative for chills, fatigue and fever.   HENT:  Negative for congestion, sinus pressure, sinus pain, sore throat and trouble swallowing.    Eyes:  Negative for photophobia and visual disturbance.   Respiratory:  Negative for cough, choking, chest tightness and shortness of breath.    Cardiovascular:  Negative for chest pain and leg swelling.   Gastrointestinal:  Positive for abdominal pain (mild abdominal discomfort) and nausea. Negative for constipation, diarrhea and vomiting.   Genitourinary:  Negative for difficulty urinating, dysuria, frequency and pelvic pain.   Musculoskeletal:  Negative for back pain, gait problem, myalgias and neck pain.   Skin:  Negative for rash.   Allergic/Immunologic: Negative for immunocompromised state.   Neurological:  Positive for seizures (with LOC) and headaches. Negative for dizziness, facial asymmetry,  speech difficulty, weakness and light-headedness.   Hematological:  Does not bruise/bleed easily.     Physical Exam     Initial Vitals [09/22/22 1840]   BP Pulse Resp Temp SpO2   120/70 (!) 130 18 98.5 °F (36.9 °C) 100 %      MAP       --         Physical Exam    Vitals reviewed.  Constitutional: She appears well-developed and well-nourished. She is not diaphoretic. No distress.   HENT:   Head: Normocephalic and atraumatic.   Patient has bite marks on her tongue during seizure episode. There is no bleeding currently and the cut is not straight through.    Eyes: Conjunctivae and EOM are normal. Pupils are equal, round, and reactive to light.   Neck: No tracheal deviation present.   Normal range of motion.  Cardiovascular:  Regular rhythm, normal heart sounds and intact distal pulses.     Exam reveals no friction rub.       No murmur heard.  Pulmonary/Chest: Breath sounds normal. No stridor. No respiratory distress. She has no wheezes. She has no rales.   Musculoskeletal:      Cervical back: Normal range of motion.     Lymphadenopathy:     She has no cervical adenopathy.       ED Course   Procedures  Labs Reviewed   COMPREHENSIVE METABOLIC PANEL - Abnormal; Notable for the following components:       Result Value    Sodium 135 (*)     CO2 21 (*)     All other components within normal limits   CBC W/ AUTO DIFFERENTIAL - Abnormal; Notable for the following components:    MCH 32.2 (*)     MCHC 36.5 (*)     Gran # (ANC) 9.0 (*)     Immature Grans (Abs) 0.06 (*)     Gran % 79.5 (*)     Lymph % 13.2 (*)     All other components within normal limits   URINALYSIS, REFLEX TO URINE CULTURE - Abnormal; Notable for the following components:    Protein, UA 1+ (*)     Ketones, UA 1+ (*)     Occult Blood UA 2+ (*)     All other components within normal limits    Narrative:     Specimen Source->Urine   URINALYSIS MICROSCOPIC - Abnormal; Notable for the following components:    Hyaline Casts, UA 3 (*)     All other components within  normal limits    Narrative:     Specimen Source->Urine   CK   LAMOTRIGINE LEVEL   POCT URINE PREGNANCY          Imaging Results    None          Medications   sodium chloride 0.9% flush 10 mL (has no administration in time range)   sodium chloride 0.9% flush 10 mL (has no administration in time range)   naloxone 0.4 mg/mL injection 0.02 mg (has no administration in time range)   glucose chewable tablet 16 g (has no administration in time range)   glucose chewable tablet 24 g (has no administration in time range)   dextrose 50% injection 12.5 g (has no administration in time range)   dextrose 50% injection 25 g (has no administration in time range)   glucagon (human recombinant) injection 1 mg (has no administration in time range)   enoxaparin injection 40 mg (has no administration in time range)   acetaminophen tablet 1,000 mg (has no administration in time range)   ondansetron injection 4 mg (has no administration in time range)   melatonin tablet 6 mg (has no administration in time range)   aluminum-magnesium hydroxide-simethicone 200-200-20 mg/5 mL suspension 30 mL (has no administration in time range)   lamoTRIgine tablet 400 mg (has no administration in time range)   OXcarbazepine tablet 900 mg (has no administration in time range)   sodium chloride 0.9% bolus 1,000 mL (1,000 mLs Intravenous New Bag 9/22/22 2055)   acetaminophen tablet 650 mg (650 mg Oral Given 9/22/22 2055)   ondansetron disintegrating tablet 4 mg (4 mg Oral Given 9/22/22 2055)   diazePAM tablet 2 mg (2 mg Oral Given 9/22/22 2224)   ketorolac injection 15 mg (15 mg Intravenous Given 9/22/22 2231)     Medical Decision Making:   History:   Old Records Summarized: records from clinic visits.       <> Summary of Records: Pt was previously seen by neuro due to similar, yet milder symptoms   Initial Assessment:   31 y/o female presenting with worsening seizure presentation undiagnosed   Differential Diagnosis:   1. Electrolyte derangement   2.  "Seizure disorder  3. Intracranial abnormalities   Clinical Tests:   Lab Tests: Ordered and Reviewed  ED Management:  Pt is presenting in what appears to be a worsening presentation of seizure like activity, over the past two years she has had a series of " dissociative" silent seizure like events with today worsening to a tonic clonic like episode.   Pt had a Brain MRI in January this year with no intracranial abnormalities seen  She is on Lamatrogine and Trileptal  bipolar disorder, with only missing a dose yesterday  PT does not report recent fever or illness, and no leukocytosis was seen on CBC along with no evidence of KEIKO post event.   CMP did not indicate a electrolyte derangement, CPK also wnl post seizure   She is currently experiencing nausea and headaches - given acetaminophen and zofran   Pt is also mildly tachycardic and given fluid bolus     She will be admitted to Observation with neurology follow up, due to worsening of symptoms for continued management           Attending Attestation:     Physician Attestation Statement for NP/PA:   I have conducted a face to face encounter with this patient in addition to the NP/PA, due to NP/PA Request    Other NP/PA Attestation Additions:    History of Present Illness: Tonic-clonic seizure in the setting of previous nonepileptic events.  This seizure was different than the others and during the episode she bit her tongue and lost consciousness.  It was witnessed by her partner.  4 minutes in length.  There was postictal confusion.  She had a recent evaluation for seizures in outpatient that was unrevealing.  Placed in observation for further evaluation.                          Clinical Impression:   Final diagnoses:  [R56.9] Seizure        ED Disposition Condition    Observation Stable                Dayna Gregorio PA-C  09/22/22 [id number]       Dayna Gregorio PA-C  09/22/22 2254    "

## 2022-09-23 NOTE — ASSESSMENT & PLAN NOTE
"Shaina Dietz is a 32 y.o. F w/ h/o bipolar II d/o (on Lamictal, Trileptal, PRN Klonopin) and self-reported migraine w/ aura for whom Neurology is consulted to evaluate seizure-like activity. Pt reports first experiencing "depersonalization" episodes 2 yrs ago. During these episodes, she stares off into distance, unable to find words, but has a complete recollection of what occurs. Had OP neuro eval in Jan '22; MRI brain and EEG unremarkable at that time. Had 3 staring episodes yesterday. Was nauseated, tearful. Staring that lasted ~2 min. Felt a sense of melissa vu and impending doom and voices were "muffled." Then, pt's eyes rolled back and she began "convulsing" and "flailing" for ~5 min. Reports LOC and tongue-biting but denies incontinence. Pt's lips reportedly became blue. Pt confused, agitated after event. Home meds include Lamictal 400 (missed dose yesterday), Trileptal 750, Klonopin 0.5-1 as needed (last took 4 d ago). Reports taking Benadryl (last 2 d ago). Reports a family h/o juvenile myoclonic epilepsy and CVA in father. Denies recent sleep disturbance, EtOH and substance use, and life stressors.     Given pt's description of dissociative events, these may be panic attacks vs migraines; absence seizures unlikely given pt's age, recollection of events/no LOC. Subsequent episode of LOC and jerking movements yesterday likely psychogenic nonepileptic seizure, considering pt on 2 AEDs, or possibly provoked seizure.     Recommendations:  · Increase Trileptal to 600 mg bid for additional seizure coverage  · Continue Lamictal 400 mg daily  · Outpatient Neurology follow-up  · No further inpatient neurologic work-up warranted at this time  · Follow up w/ outpatient psychiatrist  "

## 2022-09-24 VITALS
HEART RATE: 102 BPM | HEIGHT: 65 IN | SYSTOLIC BLOOD PRESSURE: 147 MMHG | OXYGEN SATURATION: 98 % | RESPIRATION RATE: 18 BRPM | BODY MASS INDEX: 36.65 KG/M2 | WEIGHT: 220 LBS | DIASTOLIC BLOOD PRESSURE: 93 MMHG | TEMPERATURE: 98 F

## 2022-09-24 LAB
ALBUMIN SERPL BCP-MCNC: 3.6 G/DL (ref 3.5–5.2)
ALP SERPL-CCNC: 78 U/L (ref 55–135)
ALT SERPL W/O P-5'-P-CCNC: 12 U/L (ref 10–44)
ANION GAP SERPL CALC-SCNC: 7 MMOL/L (ref 8–16)
AST SERPL-CCNC: 17 U/L (ref 10–40)
BASOPHILS # BLD AUTO: 0.06 K/UL (ref 0–0.2)
BASOPHILS NFR BLD: 0.7 % (ref 0–1.9)
BILIRUB SERPL-MCNC: 0.2 MG/DL (ref 0.1–1)
BUN SERPL-MCNC: 12 MG/DL (ref 6–20)
CALCIUM SERPL-MCNC: 9.3 MG/DL (ref 8.7–10.5)
CHLORIDE SERPL-SCNC: 106 MMOL/L (ref 95–110)
CO2 SERPL-SCNC: 25 MMOL/L (ref 23–29)
CREAT SERPL-MCNC: 0.8 MG/DL (ref 0.5–1.4)
DIFFERENTIAL METHOD: ABNORMAL
EOSINOPHIL # BLD AUTO: 0.2 K/UL (ref 0–0.5)
EOSINOPHIL NFR BLD: 2.5 % (ref 0–8)
ERYTHROCYTE [DISTWIDTH] IN BLOOD BY AUTOMATED COUNT: 13 % (ref 11.5–14.5)
EST. GFR  (NO RACE VARIABLE): >60 ML/MIN/1.73 M^2
GLUCOSE SERPL-MCNC: 93 MG/DL (ref 70–110)
HCT VFR BLD AUTO: 38.3 % (ref 37–48.5)
HGB BLD-MCNC: 13.4 G/DL (ref 12–16)
IMM GRANULOCYTES # BLD AUTO: 0.02 K/UL (ref 0–0.04)
IMM GRANULOCYTES NFR BLD AUTO: 0.2 % (ref 0–0.5)
LYMPHOCYTES # BLD AUTO: 3.4 K/UL (ref 1–4.8)
LYMPHOCYTES NFR BLD: 42.6 % (ref 18–48)
MAGNESIUM SERPL-MCNC: 1.7 MG/DL (ref 1.6–2.6)
MCH RBC QN AUTO: 31.4 PG (ref 27–31)
MCHC RBC AUTO-ENTMCNC: 35 G/DL (ref 32–36)
MCV RBC AUTO: 90 FL (ref 82–98)
MONOCYTES # BLD AUTO: 0.7 K/UL (ref 0.3–1)
MONOCYTES NFR BLD: 8.8 % (ref 4–15)
NEUTROPHILS # BLD AUTO: 3.6 K/UL (ref 1.8–7.7)
NEUTROPHILS NFR BLD: 45.2 % (ref 38–73)
NRBC BLD-RTO: 0 /100 WBC
PLATELET # BLD AUTO: 294 K/UL (ref 150–450)
PMV BLD AUTO: 9.7 FL (ref 9.2–12.9)
POTASSIUM SERPL-SCNC: 3.8 MMOL/L (ref 3.5–5.1)
PROT SERPL-MCNC: 6.7 G/DL (ref 6–8.4)
RBC # BLD AUTO: 4.27 M/UL (ref 4–5.4)
SODIUM SERPL-SCNC: 138 MMOL/L (ref 136–145)
WBC # BLD AUTO: 8.05 K/UL (ref 3.9–12.7)

## 2022-09-24 PROCEDURE — 25000003 PHARM REV CODE 250: Performed by: STUDENT IN AN ORGANIZED HEALTH CARE EDUCATION/TRAINING PROGRAM

## 2022-09-24 PROCEDURE — 80053 COMPREHEN METABOLIC PANEL: CPT | Performed by: FAMILY MEDICINE

## 2022-09-24 PROCEDURE — 25000003 PHARM REV CODE 250: Performed by: FAMILY MEDICINE

## 2022-09-24 PROCEDURE — 36415 COLL VENOUS BLD VENIPUNCTURE: CPT | Performed by: FAMILY MEDICINE

## 2022-09-24 PROCEDURE — 83735 ASSAY OF MAGNESIUM: CPT | Performed by: FAMILY MEDICINE

## 2022-09-24 PROCEDURE — G0378 HOSPITAL OBSERVATION PER HR: HCPCS

## 2022-09-24 PROCEDURE — 99217 PR OBSERVATION CARE DISCHARGE: CPT | Mod: ,,, | Performed by: STUDENT IN AN ORGANIZED HEALTH CARE EDUCATION/TRAINING PROGRAM

## 2022-09-24 PROCEDURE — 99217 PR OBSERVATION CARE DISCHARGE: ICD-10-PCS | Mod: ,,, | Performed by: STUDENT IN AN ORGANIZED HEALTH CARE EDUCATION/TRAINING PROGRAM

## 2022-09-24 PROCEDURE — 85025 COMPLETE CBC W/AUTO DIFF WBC: CPT | Performed by: FAMILY MEDICINE

## 2022-09-24 RX ORDER — OXCARBAZEPINE 600 MG/1
600 TABLET, FILM COATED ORAL 2 TIMES DAILY
Qty: 60 TABLET | Refills: 1 | Status: SHIPPED | OUTPATIENT
Start: 2022-09-24 | End: 2023-09-24

## 2022-09-24 RX ADMIN — LAMOTRIGINE 400 MG: 100 TABLET ORAL at 08:09

## 2022-09-24 RX ADMIN — OXCARBAZEPINE 600 MG: 150 TABLET, FILM COATED ORAL at 08:09

## 2022-09-24 NOTE — CONSULTS
Thank you for your consult to [facility]. We have reviewed the patient chart. This patient does not meet criteria for [facility] service at this time due to [facility] service capacity limitations. Will hand back to In-house service.    Belkis Mitchell MD

## 2022-09-24 NOTE — PLAN OF CARE
Problem: Adult Inpatient Plan of Care  Goal: Plan of Care Review  Outcome: Ongoing, Progressing     Problem: Adult Inpatient Plan of Care  Goal: Patient-Specific Goal (Individualized)  Outcome: Ongoing, Progressing     Problem: Adult Inpatient Plan of Care  Goal: Readiness for Transition of Care  Outcome: Ongoing, Progressing     Problem: Seizure, Active Management  Goal: Absence of Seizure/Seizure-Related Injury  Outcome: Ongoing, Progressing      AAOX4. Pt c/o mild pain on tongue from seizure episode on 09/22, tylenol administered. Seizure precautions in place , safety maintained

## 2022-09-24 NOTE — DISCHARGE SUMMARY
[facility]ino [facility] - Intensive Care ([place] [address])  [facility] Medicine  Discharge Summary      Patient Name: Shaina Dietz  MRN: [id number]  Patient Class: OP- Observation  Admission Date: 9/22/2022  Hospital Length of Stay: 0 days  Discharge Date and Time:  09/24/2022 11:57 AM  Attending Physician: Jama Lynch MD   Discharging Provider: Jama Lynch MD  Primary Care Provider: Niel Escalante MD  [facility] Medicine Team: [facility] Jama Lynch MD    HPI:   This is a 31yo female with a past medical history of Bipolar disorder on lamotrigine and trileptal daily who presents to the ED with chief complaint of seizure. Patient currently follows with Psychiatry and Neurology. Beginning about two years ago she developed new onset episodes of 1-2 minute episodes with melissa vu, feeling of impending doom, alterations in hearing (described as feeling like her hearing is underwater), cognitive fog, word-finding difficulty or inability to speak. Episodes reportedly associated with fist clinching and reportedly unable to interact during events. Previously She endorses full recollection of each event with no bowel/bladder incontinence, rhythmic shaking, tongue biting, or uncontrolled movements. She underwent EEG, MRI and Neuro eval for this with unremarkable findings and notably without noted seizure activity.   Today patient reports similar prodrome but then had witnessed generalized shaking with blue change to lips lasting 3-4minutes. Episode witnessed by partner who was present during exam. Patient states that this time she was not aware of episode and had confusion and panic feelings afterwards. She did not have loss of bowel or bladder control. Patient does report missing her home lamotrigine dose yesterday but states otherwise has been compliant.  In the ED patient afebrile and hemodynamically stable saturating well on room air. Patient denies fevers, chills, numbness, confusion, chest pain, shortness of breath, or  weakness. Admitted to the care of medicine for further evaluation and management.      * No surgery found *      Hospital Course:   Pt admitted to [facility], and continued to remain stable overnight and into 9/23. No further seizure activity, stable at baseline. Neurology consulted, and recommended increasing Trileptal to 600mg BID. Pt said she discussed this with her private psychiatrist, who had concerns for medication-induced hyponatremia (per pt, this was an issue in the past). Na stable on AM labs, and pt agreeable to trial of increased Trileptal with close outpatient follow-up with psychiatry and neurology for symptoms and electrolyte derrangements. Continued to remain seizure-free into 9/24 and grossly stable at her baseline. Pt deemed appropriate for discharge. Driving precautions given; states she does not plan to drive, and her girlfriend and family will assist while further outpatient workup ongoing. Plan discussed with pt, who was agreeable and amenable; medications were discussed and reviewed, outpatient follow-up scheduled, ER precautions were given, all questions were answered to the pt's satisfaction, and Ms. Dietz was subsequently discharged.         Goals of Care Treatment Preferences:  Code Status: Full Code      Consults:   Consults (From admission, onward)        Status Ordering Provider     Inpatient virtual consult to Hospital Medicine  Once        Provider:  (Not yet assigned)    Completed MAHNAZ LUBIN     Inpatient consult to Neurology  Once        Provider:  (Not yet assigned)    Completed MAHNAZ LUBIN          No new Assessment & Plan notes have been filed under this hospital service since the last note was generated.  Service: Hospital Medicine    Final Active Diagnoses:    Diagnosis Date Noted POA    PRINCIPAL PROBLEM:  Transient neurological symptoms [R29.818] 01/12/2022 Yes    Bipolar 2 disorder [F31.81] 08/01/2010 Yes    Class 2 obesity due to excess calories without serious  comorbidity with body mass index (BMI) of 36.0 to 36.9 in adult [E66.09, Z68.36] 09/23/2022 Not Applicable      Problems Resolved During this Admission:       Discharged Condition: stable    Disposition: Home or Self Care    Follow Up:   Follow-up Information     Neil Escalante MD. Schedule an appointment as soon as possible for a visit.    Specialty: Internal Medicine  Contact information:  [address] [place], [place] [id number] [phone]                       Patient Instructions:      Ambulatory referral/consult to Neurology   Standing Status: Future   Referral Priority: Urgent Referral Type: Consultation   Referral Reason: Specialty Services Required   Requested Specialty: Neurology   Number of Visits Requested: 1     Diet Adult Regular     Notify your health care provider if you experience any of the following:  increased confusion or weakness     Notify your health care provider if you experience any of the following:  persistent dizziness, light-headedness, or visual disturbances     Notify your health care provider if you experience any of the following:  worsening rash     Notify your health care provider if you experience any of the following:  severe persistent headache     Notify your health care provider if you experience any of the following:  difficulty breathing or increased cough     Notify your health care provider if you experience any of the following:  severe uncontrolled pain     Notify your health care provider if you experience any of the following:  persistent nausea and vomiting or diarrhea     Notify your health care provider if you experience any of the following:  temperature >100.4     Activity as tolerated       Significant Diagnostic Studies: Labs: All labs within the past 24 hours have been reviewed    Pending Diagnostic Studies:     Procedure Component Value Units Date/Time    Lamotrigine level [[id number]] Collected: 09/22/22 1953    Order Status: Sent Lab Status:  In process Updated: 09/22/22 2028    Specimen: Blood          Medications:  Reconciled Home Medications:      Medication List      CHANGE how you take these medications    OXcarbazepine 600 MG Tab  Commonly known as: TRILEPTAL  Take 1 tablet (600 mg total) by mouth 2 (two) times daily.  What changed:   · medication strength  · See the new instructions.        CONTINUE taking these medications    clonazePAM 1 MG tablet  Commonly known as: KlonoPIN  Take 0.5-1 mg by mouth daily as needed.     fluticasone propionate 50 mcg/actuation nasal spray  Commonly known as: FLONASE  2 sprays (100 mcg total) by Each Nostril route once daily.     lamoTRIgine 200 MG tablet  Commonly known as: LAMICTAL     OLANZapine 5 MG tablet  Commonly known as: ZyPREXA  Take 5 mg by mouth nightly.            Indwelling Lines/Drains at time of discharge:   Lines/Drains/Airways     None                 Time spent on the discharge of patient: 35 minutes       Jama Lynch MD  Attending Physician  Department of Hospital Medicine  Epic secure chat preferred, or ext. [phone]  9/24/2022

## 2022-09-26 LAB — LAMOTRIGINE SERPL-MCNC: 4 UG/ML (ref 2–15)

## 2022-10-05 ENCOUNTER — TELEPHONE (OUTPATIENT)
Dept: NEUROLOGY | Facility: CLINIC | Age: 32
End: 2022-10-05
Payer: COMMERCIAL

## 2022-10-05 ENCOUNTER — PATIENT MESSAGE (OUTPATIENT)
Dept: NEUROLOGY | Facility: CLINIC | Age: 32
End: 2022-10-05
Payer: COMMERCIAL

## 2022-10-05 NOTE — TELEPHONE ENCOUNTER
EDWIN with patient confirming appt on 10/10 and explained her f/u was moved to [facility] per management and Dr. Camarena

## 2022-10-06 ENCOUNTER — PATIENT MESSAGE (OUTPATIENT)
Dept: BARIATRICS | Facility: CLINIC | Age: 32
End: 2022-10-06
Payer: COMMERCIAL

## 2022-12-28 ENCOUNTER — TELEPHONE (OUTPATIENT)
Dept: SMOKING CESSATION | Facility: CLINIC | Age: 32
End: 2022-12-28
Payer: COMMERCIAL

## 2023-03-02 DIAGNOSIS — Z80.3 FAMILY HISTORY OF BREAST CANCER: Primary | ICD-10-CM

## 2023-03-02 DIAGNOSIS — Z80.41 FAMILY HISTORY OF OVARIAN CANCER: ICD-10-CM

## 2023-03-09 ENCOUNTER — TELEPHONE (OUTPATIENT)
Dept: HEMATOLOGY/ONCOLOGY | Facility: CLINIC | Age: 33
End: 2023-03-09
Payer: COMMERCIAL

## 2023-03-27 ENCOUNTER — HOSPITAL ENCOUNTER (OUTPATIENT)
Dept: RADIOLOGY | Facility: HOSPITAL | Age: 33
Discharge: HOME | End: 2023-03-27
Attending: OBSTETRICS & GYNECOLOGY
Payer: COMMERCIAL

## 2023-03-27 DIAGNOSIS — L70.9 ACNE: ICD-10-CM

## 2023-03-27 PROCEDURE — 76830 TRANSVAGINAL US NON-OB: CPT | Mod: 26,,, | Performed by: RADIOLOGY

## 2023-03-27 PROCEDURE — 76830 US PELVIS COMP WITH TRANSVAG NON-OB (XPD): ICD-10-PCS | Mod: 26,,, | Performed by: RADIOLOGY

## 2023-03-27 PROCEDURE — 76856 US EXAM PELVIC COMPLETE: CPT | Mod: TC

## 2023-03-27 PROCEDURE — 76856 US PELVIS COMP WITH TRANSVAG NON-OB (XPD): ICD-10-PCS | Mod: 26,,, | Performed by: RADIOLOGY

## 2023-03-27 PROCEDURE — 76856 US EXAM PELVIC COMPLETE: CPT | Mod: 26,,, | Performed by: RADIOLOGY

## 2023-09-07 ENCOUNTER — HOSPITAL ENCOUNTER (OUTPATIENT)
Facility: HOSPITAL | Age: 33
Discharge: HOME | End: 2023-09-08
Attending: EMERGENCY MEDICINE | Admitting: FAMILY MEDICINE
Payer: COMMERCIAL

## 2023-09-07 DIAGNOSIS — R55 SYNCOPE: ICD-10-CM

## 2023-09-07 DIAGNOSIS — R00.0 TACHYCARDIA: ICD-10-CM

## 2023-09-07 DIAGNOSIS — R56.9 SEIZURE: Primary | ICD-10-CM

## 2023-09-07 DIAGNOSIS — R00.0 SINUS TACHYCARDIA: ICD-10-CM

## 2023-09-07 DIAGNOSIS — R07.9 CHEST PAIN: ICD-10-CM
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
## 2023-09-07 LAB
ALBUMIN SERPL BCP-MCNC: 4.3 G/DL (ref 3.5–5.2)
ALP SERPL-CCNC: 68 U/L (ref 55–135)
ALT SERPL W/O P-5'-P-CCNC: 14 U/L (ref 10–44)
AMPHET+METHAMPHET UR QL: NEGATIVE
ANION GAP SERPL CALC-SCNC: 7 MMOL/L (ref 8–16)
AST SERPL-CCNC: 33 U/L (ref 10–40)
BACTERIA #/AREA URNS HPF: ABNORMAL /HPF
BARBITURATES UR QL SCN>200 NG/ML: NEGATIVE
BASOPHILS # BLD AUTO: 0.1 K/UL (ref 0–0.2)
BASOPHILS NFR BLD: 0.6 % (ref 0–1.9)
BENZODIAZ UR QL SCN>200 NG/ML: ABNORMAL
BILIRUB SERPL-MCNC: 0.8 MG/DL (ref 0.1–1)
BILIRUB UR QL STRIP: NEGATIVE
BNP SERPL-MCNC: 9 PG/ML (ref 0–99)
BUN SERPL-MCNC: 11 MG/DL (ref 6–20)
BZE UR QL SCN: NEGATIVE
CALCIUM SERPL-MCNC: 9.6 MG/DL (ref 8.7–10.5)
CANNABINOIDS UR QL SCN: NEGATIVE
CHLORIDE SERPL-SCNC: 105 MMOL/L (ref 95–110)
CK SERPL-CCNC: 205 U/L (ref 20–180)
CLARITY UR: CLEAR
CO2 SERPL-SCNC: 23 MMOL/L (ref 23–29)
COLOR UR: YELLOW
CREAT SERPL-MCNC: 0.8 MG/DL (ref 0.5–1.4)
CREAT SERPL-MCNC: 0.9 MG/DL (ref 0.5–1.4)
CREAT UR-MCNC: 136 MG/DL (ref 15–325)
DIFFERENTIAL METHOD: ABNORMAL
EOSINOPHIL # BLD AUTO: 0.2 K/UL (ref 0–0.5)
EOSINOPHIL NFR BLD: 1 % (ref 0–8)
ERYTHROCYTE [DISTWIDTH] IN BLOOD BY AUTOMATED COUNT: 13 % (ref 11.5–14.5)
EST. GFR  (NO RACE VARIABLE): >60 ML/MIN/1.73 M^2
GLUCOSE SERPL-MCNC: 104 MG/DL (ref 70–110)
GLUCOSE UR QL STRIP: NEGATIVE
GROUP A STREP, MOLECULAR: NEGATIVE
HCT VFR BLD AUTO: 44.2 % (ref 37–48.5)
HGB BLD-MCNC: 15.6 G/DL (ref 12–16)
HGB UR QL STRIP: ABNORMAL
HYALINE CASTS #/AREA URNS LPF: 7 /LPF
IMM GRANULOCYTES # BLD AUTO: 0.19 K/UL (ref 0–0.04)
IMM GRANULOCYTES NFR BLD AUTO: 1.1 % (ref 0–0.5)
INFLUENZA A, MOLECULAR: NEGATIVE
INFLUENZA B, MOLECULAR: NEGATIVE
KETONES UR QL STRIP: ABNORMAL
LEUKOCYTE ESTERASE UR QL STRIP: NEGATIVE
LIPASE SERPL-CCNC: 26 U/L (ref 4–60)
LYMPHOCYTES # BLD AUTO: 4.2 K/UL (ref 1–4.8)
LYMPHOCYTES NFR BLD: 23.9 % (ref 18–48)
MAGNESIUM SERPL-MCNC: 1.6 MG/DL (ref 1.6–2.6)
MAGNESIUM SERPL-MCNC: 1.6 MG/DL (ref 1.6–2.6)
MCH RBC QN AUTO: 31.1 PG (ref 27–31)
MCHC RBC AUTO-ENTMCNC: 35.3 G/DL (ref 32–36)
MCV RBC AUTO: 88 FL (ref 82–98)
MICROSCOPIC COMMENT: ABNORMAL
MONOCYTES # BLD AUTO: 0.9 K/UL (ref 0.3–1)
MONOCYTES NFR BLD: 5.1 % (ref 4–15)
NEUTROPHILS # BLD AUTO: 12 K/UL (ref 1.8–7.7)
NEUTROPHILS NFR BLD: 68.3 % (ref 38–73)
NITRITE UR QL STRIP: NEGATIVE
NRBC BLD-RTO: 0 /100 WBC
OPIATES UR QL SCN: NEGATIVE
PCP UR QL SCN>25 NG/ML: NEGATIVE
PH UR STRIP: 6 [PH] (ref 5–8)
PLATELET # BLD AUTO: 467 K/UL (ref 150–450)
PMV BLD AUTO: 10 FL (ref 9.2–12.9)
POTASSIUM SERPL-SCNC: 4 MMOL/L (ref 3.5–5.1)
PROT SERPL-MCNC: 8.1 G/DL (ref 6–8.4)
PROT UR QL STRIP: ABNORMAL
RBC # BLD AUTO: 5.02 M/UL (ref 4–5.4)
RBC #/AREA URNS HPF: 10 /HPF (ref 0–4)
SAMPLE: NORMAL
SARS-COV-2 RDRP RESP QL NAA+PROBE: NEGATIVE
SODIUM SERPL-SCNC: 135 MMOL/L (ref 136–145)
SP GR UR STRIP: 1.02 (ref 1–1.03)
SPECIMEN SOURCE: NORMAL
SQUAMOUS #/AREA URNS HPF: 3 /HPF
TOXICOLOGY INFORMATION: ABNORMAL
TROPONIN I SERPL HS-MCNC: 12.4 PG/ML (ref 0–14.9)
TROPONIN I SERPL HS-MCNC: 8.7 PG/ML (ref 0–14.9)
TSH SERPL DL<=0.005 MIU/L-ACNC: 1.67 UIU/ML (ref 0.34–5.6)
URN SPEC COLLECT METH UR: ABNORMAL
UROBILINOGEN UR STRIP-ACNC: NEGATIVE EU/DL
WBC # BLD AUTO: 17.61 K/UL (ref 3.9–12.7)
WBC #/AREA URNS HPF: 1 /HPF (ref 0–5)

## 2023-09-07 PROCEDURE — 96372 THER/PROPH/DIAG INJ SC/IM: CPT | Mod: 59 | Performed by: FAMILY MEDICINE

## 2023-09-07 PROCEDURE — 84484 ASSAY OF TROPONIN QUANT: CPT | Mod: 91 | Performed by: EMERGENCY MEDICINE

## 2023-09-07 PROCEDURE — 85025 COMPLETE CBC W/AUTO DIFF WBC: CPT | Performed by: EMERGENCY MEDICINE

## 2023-09-07 PROCEDURE — 25000003 PHARM REV CODE 250: Performed by: EMERGENCY MEDICINE

## 2023-09-07 PROCEDURE — 63600175 PHARM REV CODE 636 W HCPCS: Performed by: FAMILY MEDICINE

## 2023-09-07 PROCEDURE — G0378 HOSPITAL OBSERVATION PER HR: HCPCS

## 2023-09-07 PROCEDURE — 96365 THER/PROPH/DIAG IV INF INIT: CPT

## 2023-09-07 PROCEDURE — 96361 HYDRATE IV INFUSION ADD-ON: CPT

## 2023-09-07 PROCEDURE — 96366 THER/PROPH/DIAG IV INF ADDON: CPT

## 2023-09-07 PROCEDURE — 25000003 PHARM REV CODE 250: Performed by: FAMILY MEDICINE

## 2023-09-07 PROCEDURE — 80307 DRUG TEST PRSMV CHEM ANLYZR: CPT | Performed by: EMERGENCY MEDICINE

## 2023-09-07 PROCEDURE — 82550 ASSAY OF CK (CPK): CPT | Performed by: EMERGENCY MEDICINE

## 2023-09-07 PROCEDURE — U0002 COVID-19 LAB TEST NON-CDC: HCPCS | Performed by: EMERGENCY MEDICINE

## 2023-09-07 PROCEDURE — 83735 ASSAY OF MAGNESIUM: CPT | Performed by: EMERGENCY MEDICINE

## 2023-09-07 PROCEDURE — 81001 URINALYSIS AUTO W/SCOPE: CPT | Performed by: EMERGENCY MEDICINE

## 2023-09-07 PROCEDURE — 87651 STREP A DNA AMP PROBE: CPT | Performed by: EMERGENCY MEDICINE

## 2023-09-07 PROCEDURE — 93005 ELECTROCARDIOGRAM TRACING: CPT | Performed by: GENERAL PRACTICE

## 2023-09-07 PROCEDURE — 82565 ASSAY OF CREATININE: CPT | Mod: 59

## 2023-09-07 PROCEDURE — 87502 INFLUENZA DNA AMP PROBE: CPT | Performed by: EMERGENCY MEDICINE

## 2023-09-07 PROCEDURE — 84443 ASSAY THYROID STIM HORMONE: CPT | Performed by: EMERGENCY MEDICINE

## 2023-09-07 PROCEDURE — 93010 EKG 12-LEAD: ICD-10-PCS | Mod: ,,, | Performed by: GENERAL PRACTICE

## 2023-09-07 PROCEDURE — 99285 EMERGENCY DEPT VISIT HI MDM: CPT | Mod: 25

## 2023-09-07 PROCEDURE — 93010 ELECTROCARDIOGRAM REPORT: CPT | Mod: ,,, | Performed by: GENERAL PRACTICE

## 2023-09-07 PROCEDURE — 83690 ASSAY OF LIPASE: CPT | Performed by: EMERGENCY MEDICINE

## 2023-09-07 PROCEDURE — 80053 COMPREHEN METABOLIC PANEL: CPT | Performed by: EMERGENCY MEDICINE

## 2023-09-07 PROCEDURE — 83880 ASSAY OF NATRIURETIC PEPTIDE: CPT | Performed by: EMERGENCY MEDICINE

## 2023-09-07 PROCEDURE — 25500020 PHARM REV CODE 255: Performed by: EMERGENCY MEDICINE

## 2023-09-07 RX ORDER — NALOXONE HCL 0.4 MG/ML
0.02 VIAL (ML) INJECTION
Status: DISCONTINUED | OUTPATIENT
Start: 2023-09-07 | End: 2023-09-08 | Stop reason: HOSPADM

## 2023-09-07 RX ORDER — ACETAMINOPHEN 325 MG/1
650 TABLET ORAL EVERY 8 HOURS PRN
Status: DISCONTINUED | OUTPATIENT
Start: 2023-09-07 | End: 2023-09-08 | Stop reason: HOSPADM

## 2023-09-07 RX ORDER — CLOBAZAM 10 MG/1
10 TABLET ORAL 2 TIMES DAILY
COMMUNITY
Start: 2023-08-18 | End: 2024-03-28

## 2023-09-07 RX ORDER — LAMOTRIGINE 150 MG/1
TABLET ORAL
COMMUNITY
Start: 2023-08-18

## 2023-09-07 RX ORDER — TIZANIDINE 2 MG/1
2 TABLET ORAL ONCE
Status: COMPLETED | OUTPATIENT
Start: 2023-09-07 | End: 2023-09-07

## 2023-09-07 RX ORDER — POLYETHYLENE GLYCOL 3350 17 G/17G
17 POWDER, FOR SOLUTION ORAL 2 TIMES DAILY PRN
Status: DISCONTINUED | OUTPATIENT
Start: 2023-09-07 | End: 2023-09-08 | Stop reason: HOSPADM

## 2023-09-07 RX ORDER — TALC
6 POWDER (GRAM) TOPICAL NIGHTLY PRN
Status: DISCONTINUED | OUTPATIENT
Start: 2023-09-07 | End: 2023-09-08 | Stop reason: HOSPADM

## 2023-09-07 RX ORDER — CARBINOXAMINE MALEATE 4 MG/1
TABLET ORAL
COMMUNITY
Start: 2023-06-30 | End: 2023-09-07

## 2023-09-07 RX ORDER — DIPHENHYDRAMINE HCL 25 MG
TABLET ORAL
COMMUNITY
End: 2023-09-07

## 2023-09-07 RX ORDER — GLUCAGON 1 MG
1 KIT INJECTION
Status: DISCONTINUED | OUTPATIENT
Start: 2023-09-07 | End: 2023-09-08 | Stop reason: HOSPADM

## 2023-09-07 RX ORDER — TRAMADOL HYDROCHLORIDE 50 MG/1
50 TABLET ORAL EVERY 8 HOURS PRN
Status: DISCONTINUED | OUTPATIENT
Start: 2023-09-07 | End: 2023-09-08

## 2023-09-07 RX ORDER — SIMETHICONE 80 MG
1 TABLET,CHEWABLE ORAL 4 TIMES DAILY PRN
Status: DISCONTINUED | OUTPATIENT
Start: 2023-09-07 | End: 2023-09-08 | Stop reason: HOSPADM

## 2023-09-07 RX ORDER — RISPERIDONE 0.25 MG/1
0.25 TABLET ORAL NIGHTLY
Status: DISCONTINUED | OUTPATIENT
Start: 2023-09-07 | End: 2023-09-08 | Stop reason: HOSPADM

## 2023-09-07 RX ORDER — IBUPROFEN 200 MG
24 TABLET ORAL
Status: DISCONTINUED | OUTPATIENT
Start: 2023-09-07 | End: 2023-09-08 | Stop reason: HOSPADM

## 2023-09-07 RX ORDER — METFORMIN HYDROCHLORIDE 500 MG/1
1000 TABLET, EXTENDED RELEASE ORAL 2 TIMES DAILY
COMMUNITY
Start: 2023-06-24 | End: 2023-09-07

## 2023-09-07 RX ORDER — MAGNESIUM SULFATE HEPTAHYDRATE 40 MG/ML
4 INJECTION, SOLUTION INTRAVENOUS ONCE
Status: COMPLETED | OUTPATIENT
Start: 2023-09-07 | End: 2023-09-08

## 2023-09-07 RX ORDER — ACETAMINOPHEN 500 MG
1000 TABLET ORAL
Status: COMPLETED | OUTPATIENT
Start: 2023-09-07 | End: 2023-09-07

## 2023-09-07 RX ORDER — MORPHINE SULFATE 4 MG/ML
4 INJECTION, SOLUTION INTRAMUSCULAR; INTRAVENOUS EVERY 4 HOURS PRN
Status: DISCONTINUED | OUTPATIENT
Start: 2023-09-07 | End: 2023-09-08 | Stop reason: HOSPADM

## 2023-09-07 RX ORDER — LAMOTRIGINE 100 MG/1
300 TABLET ORAL DAILY
Status: DISCONTINUED | OUTPATIENT
Start: 2023-09-08 | End: 2023-09-08 | Stop reason: HOSPADM

## 2023-09-07 RX ORDER — LORAZEPAM 2 MG/ML
2 INJECTION INTRAMUSCULAR
Status: DISCONTINUED | OUTPATIENT
Start: 2023-09-07 | End: 2023-09-08 | Stop reason: HOSPADM

## 2023-09-07 RX ORDER — SODIUM CHLORIDE 9 MG/ML
INJECTION, SOLUTION INTRAVENOUS CONTINUOUS
Status: DISCONTINUED | OUTPATIENT
Start: 2023-09-07 | End: 2023-09-08 | Stop reason: HOSPADM

## 2023-09-07 RX ORDER — HYDROCODONE BITARTRATE AND ACETAMINOPHEN 5; 325 MG/1; MG/1
1 TABLET ORAL EVERY 4 HOURS PRN
Status: DISCONTINUED | OUTPATIENT
Start: 2023-09-07 | End: 2023-09-08

## 2023-09-07 RX ORDER — AMOXICILLIN 250 MG
1 CAPSULE ORAL 2 TIMES DAILY PRN
Status: DISCONTINUED | OUTPATIENT
Start: 2023-09-07 | End: 2023-09-08 | Stop reason: HOSPADM

## 2023-09-07 RX ORDER — QUETIAPINE FUMARATE 100 MG/1
100 TABLET, FILM COATED ORAL DAILY
COMMUNITY
Start: 2015-01-25

## 2023-09-07 RX ORDER — SPIRONOLACTONE 50 MG/1
50 TABLET, FILM COATED ORAL DAILY
COMMUNITY
Start: 2023-08-28 | End: 2024-03-28

## 2023-09-07 RX ORDER — SPIRONOLACTONE 25 MG/1
TABLET ORAL
COMMUNITY
End: 2024-03-28

## 2023-09-07 RX ORDER — IPRATROPIUM BROMIDE AND ALBUTEROL SULFATE 2.5; .5 MG/3ML; MG/3ML
3 SOLUTION RESPIRATORY (INHALATION) EVERY 4 HOURS PRN
Status: DISCONTINUED | OUTPATIENT
Start: 2023-09-07 | End: 2023-09-08 | Stop reason: HOSPADM

## 2023-09-07 RX ORDER — LEVETIRACETAM 5 MG/ML
500 INJECTION INTRAVASCULAR EVERY 12 HOURS
Status: DISCONTINUED | OUTPATIENT
Start: 2023-09-08 | End: 2023-09-07

## 2023-09-07 RX ORDER — ONDANSETRON 2 MG/ML
4 INJECTION INTRAMUSCULAR; INTRAVENOUS EVERY 6 HOURS PRN
Status: DISCONTINUED | OUTPATIENT
Start: 2023-09-07 | End: 2023-09-08 | Stop reason: HOSPADM

## 2023-09-07 RX ORDER — LEVETIRACETAM 10 MG/ML
1000 INJECTION INTRAVASCULAR ONCE
Status: COMPLETED | OUTPATIENT
Start: 2023-09-07 | End: 2023-09-07

## 2023-09-07 RX ORDER — RISPERIDONE 0.25 MG/1
0.5 TABLET ORAL NIGHTLY
COMMUNITY
Start: 2023-07-07

## 2023-09-07 RX ORDER — OXCARBAZEPINE 300 MG/1
600 TABLET, FILM COATED ORAL 2 TIMES DAILY
COMMUNITY
Start: 2023-04-05 | End: 2024-03-28

## 2023-09-07 RX ORDER — ENOXAPARIN SODIUM 100 MG/ML
40 INJECTION SUBCUTANEOUS EVERY 24 HOURS
Status: DISCONTINUED | OUTPATIENT
Start: 2023-09-07 | End: 2023-09-08

## 2023-09-07 RX ORDER — SODIUM CHLORIDE 0.9 % (FLUSH) 0.9 %
10 SYRINGE (ML) INJECTION
Status: DISCONTINUED | OUTPATIENT
Start: 2023-09-07 | End: 2023-09-08 | Stop reason: HOSPADM

## 2023-09-07 RX ORDER — IBUPROFEN 200 MG
16 TABLET ORAL
Status: DISCONTINUED | OUTPATIENT
Start: 2023-09-07 | End: 2023-09-08 | Stop reason: HOSPADM

## 2023-09-07 RX ADMIN — SODIUM CHLORIDE 1000 ML: 0.9 INJECTION, SOLUTION INTRAVENOUS at 07:09

## 2023-09-07 RX ADMIN — TRAMADOL HYDROCHLORIDE 50 MG: 50 TABLET, COATED ORAL at 09:09

## 2023-09-07 RX ADMIN — SODIUM CHLORIDE: 0.9 INJECTION, SOLUTION INTRAVENOUS at 10:09

## 2023-09-07 RX ADMIN — ENOXAPARIN SODIUM 40 MG: 40 INJECTION SUBCUTANEOUS at 09:09

## 2023-09-07 RX ADMIN — MAGNESIUM SULFATE IN WATER 4 G: 40 INJECTION, SOLUTION INTRAVENOUS at 10:09

## 2023-09-07 RX ADMIN — SODIUM CHLORIDE 1000 ML: 0.9 INJECTION, SOLUTION INTRAVENOUS at 10:09

## 2023-09-07 RX ADMIN — RISPERIDONE 0.25 MG: 0.25 TABLET ORAL at 10:09

## 2023-09-07 RX ADMIN — LAMOTRIGINE 450 MG: 100 TABLET ORAL at 10:09

## 2023-09-07 RX ADMIN — ACETAMINOPHEN 1000 MG: 500 TABLET ORAL at 05:09

## 2023-09-07 RX ADMIN — TIZANIDINE 2 MG: 2 TABLET ORAL at 10:09

## 2023-09-07 RX ADMIN — IOHEXOL 100 ML: 350 INJECTION, SOLUTION INTRAVENOUS at 06:09

## 2023-09-07 RX ADMIN — LEVETIRACETAM 1000 MG: 10 INJECTION INTRAVENOUS at 10:09

## 2023-09-08 ENCOUNTER — CLINICAL SUPPORT (OUTPATIENT)
Dept: CARDIOLOGY | Facility: HOSPITAL | Age: 33
End: 2023-09-08
Attending: FAMILY MEDICINE
Payer: COMMERCIAL

## 2023-09-08 VITALS — WEIGHT: 230 LBS | BODY MASS INDEX: 38.32 KG/M2 | HEIGHT: 65 IN

## 2023-09-08 VITALS
DIASTOLIC BLOOD PRESSURE: 63 MMHG | TEMPERATURE: 98 F | HEIGHT: 65 IN | OXYGEN SATURATION: 93 % | BODY MASS INDEX: 38.44 KG/M2 | HEART RATE: 93 BPM | SYSTOLIC BLOOD PRESSURE: 114 MMHG | RESPIRATION RATE: 20 BRPM | WEIGHT: 230.69 LBS

## 2023-09-08 LAB
ALBUMIN SERPL BCP-MCNC: 3.6 G/DL (ref 3.5–5.2)
ALP SERPL-CCNC: 60 U/L (ref 55–135)
ALT SERPL W/O P-5'-P-CCNC: 14 U/L (ref 10–44)
ANION GAP SERPL CALC-SCNC: 6 MMOL/L (ref 8–16)
AORTIC ROOT ANNULUS: 2.9 CM
AORTIC VALVE CUSP SEPERATION: 2.1 CM
ASCENDING AORTA: 2.8 CM
AST SERPL-CCNC: 19 U/L (ref 10–40)
AV INDEX (PROSTH): 0.74
AV MEAN GRADIENT: 4 MMHG
AV PEAK GRADIENT: 7 MMHG
AV VALVE AREA BY VELOCITY RATIO: 2.61 CM²
AV VALVE AREA: 2.55 CM²
AV VELOCITY RATIO: 0.75
BACTERIA #/AREA URNS HPF: NEGATIVE /HPF
BASOPHILS # BLD AUTO: 0.08 K/UL (ref 0–0.2)
BASOPHILS NFR BLD: 0.6 % (ref 0–1.9)
BILIRUB SERPL-MCNC: 0.6 MG/DL (ref 0.1–1)
BILIRUB UR QL STRIP: NEGATIVE
BSA FOR ECHO PROCEDURE: 2.19 M2
BUN SERPL-MCNC: 8 MG/DL (ref 6–20)
CALCIUM SERPL-MCNC: 8.4 MG/DL (ref 8.7–10.5)
CHLORIDE SERPL-SCNC: 108 MMOL/L (ref 95–110)
CLARITY UR: CLEAR
CO2 SERPL-SCNC: 24 MMOL/L (ref 23–29)
COLOR UR: YELLOW
CREAT SERPL-MCNC: 0.7 MG/DL (ref 0.5–1.4)
CV ECHO LV RWT: 0.37 CM
DIFFERENTIAL METHOD: ABNORMAL
DOP CALC AO PEAK VEL: 1.34 M/S
DOP CALC AO VTI: 25.3 CM
DOP CALC LVOT AREA: 3.5 CM2
DOP CALC LVOT DIAMETER: 2.1 CM
DOP CALC LVOT PEAK VEL: 1.01 M/S
DOP CALC LVOT STROKE VOLUME: 64.39 CM3
DOP CALC MV VTI: 13.4 CM
DOP CALCLVOT PEAK VEL VTI: 18.6 CM
E WAVE DECELERATION TIME: 151 MSEC
E/A RATIO: 1.19
E/E' RATIO: 11.8 M/S
ECHO LV POSTERIOR WALL: 0.93 CM (ref 0.6–1.1)
EOSINOPHIL # BLD AUTO: 0.1 K/UL (ref 0–0.5)
EOSINOPHIL NFR BLD: 0.6 % (ref 0–8)
ERYTHROCYTE [DISTWIDTH] IN BLOOD BY AUTOMATED COUNT: 13.3 % (ref 11.5–14.5)
EST. GFR  (NO RACE VARIABLE): >60 ML/MIN/1.73 M^2
ESTIMATED AVG GLUCOSE: 88 MG/DL (ref 68–131)
FRACTIONAL SHORTENING: 30 % (ref 28–44)
GLUCOSE SERPL-MCNC: 99 MG/DL (ref 70–110)
GLUCOSE UR QL STRIP: NEGATIVE
HBA1C MFR BLD: 4.7 % (ref 4.5–6.2)
HCT VFR BLD AUTO: 37.8 % (ref 37–48.5)
HGB BLD-MCNC: 13.6 G/DL (ref 12–16)
HGB UR QL STRIP: ABNORMAL
HYALINE CASTS #/AREA URNS LPF: 0 /LPF
IMM GRANULOCYTES # BLD AUTO: 0.07 K/UL (ref 0–0.04)
IMM GRANULOCYTES NFR BLD AUTO: 0.5 % (ref 0–0.5)
INTERVENTRICULAR SEPTUM: 0.88 CM (ref 0.6–1.1)
IVC DIAMETER: 2.03 CM
KETONES UR QL STRIP: ABNORMAL
LEFT ATRIUM VOLUME INDEX MOD: 25.7 ML/M2
LEFT ATRIUM VOLUME MOD: 54 CM3
LEFT INTERNAL DIMENSION IN SYSTOLE: 3.52 CM (ref 2.1–4)
LEFT VENTRICLE DIASTOLIC VOLUME INDEX: 56.67 ML/M2
LEFT VENTRICLE DIASTOLIC VOLUME: 119 ML
LEFT VENTRICLE MASS INDEX: 76 G/M2
LEFT VENTRICLE SYSTOLIC VOLUME INDEX: 24.6 ML/M2
LEFT VENTRICLE SYSTOLIC VOLUME: 51.6 ML
LEFT VENTRICULAR INTERNAL DIMENSION IN DIASTOLE: 5.02 CM (ref 3.5–6)
LEFT VENTRICULAR MASS: 160.43 G
LEUKOCYTE ESTERASE UR QL STRIP: NEGATIVE
LV LATERAL E/E' RATIO: 7.87 M/S
LV SEPTAL E/E' RATIO: 23.6 M/S
LVOT MG: 3 MMHG
LVOT MV: 0.7 CM/S
LYMPHOCYTES # BLD AUTO: 3.8 K/UL (ref 1–4.8)
LYMPHOCYTES NFR BLD: 26.7 % (ref 18–48)
MAGNESIUM SERPL-MCNC: 2.3 MG/DL (ref 1.6–2.6)
MCH RBC QN AUTO: 32.1 PG (ref 27–31)
MCHC RBC AUTO-ENTMCNC: 36 G/DL (ref 32–36)
MCV RBC AUTO: 89 FL (ref 82–98)
MICROSCOPIC COMMENT: NORMAL
MONOCYTES # BLD AUTO: 0.7 K/UL (ref 0.3–1)
MONOCYTES NFR BLD: 5.1 % (ref 4–15)
MV MEAN GRADIENT: 2 MMHG
MV PEAK A VEL: 0.99 M/S
MV PEAK E VEL: 1.18 M/S
MV PEAK GRADIENT: 4 MMHG
MV VALVE AREA BY CONTINUITY EQUATION: 4.81 CM2
NEUTROPHILS # BLD AUTO: 9.4 K/UL (ref 1.8–7.7)
NEUTROPHILS NFR BLD: 66.5 % (ref 38–73)
NITRITE UR QL STRIP: NEGATIVE
NRBC BLD-RTO: 0 /100 WBC
PH UR STRIP: 6 [PH] (ref 5–8)
PLATELET # BLD AUTO: 298 K/UL (ref 150–450)
PLATELET BLD QL SMEAR: ABNORMAL
PMV BLD AUTO: 9.3 FL (ref 9.2–12.9)
POTASSIUM SERPL-SCNC: 3.4 MMOL/L (ref 3.5–5.1)
POTASSIUM SERPL-SCNC: 3.8 MMOL/L (ref 3.5–5.1)
PROT SERPL-MCNC: 6.7 G/DL (ref 6–8.4)
PROT UR QL STRIP: NEGATIVE
PV MV: 0.77 M/S
PV PEAK GRADIENT: 5 MMHG
PV PEAK VELOCITY: 1.11 M/S
RBC # BLD AUTO: 4.24 M/UL (ref 4–5.4)
RBC #/AREA URNS HPF: 0 /HPF (ref 0–4)
RV TISSUE DOPPLER FREE WALL SYSTOLIC VELOCITY 1 (APICAL 4 CHAMBER VIEW): 15.4 CM/S
SINUS: 2.66 CM
SODIUM SERPL-SCNC: 138 MMOL/L (ref 136–145)
SP GR UR STRIP: 1.01 (ref 1–1.03)
SQUAMOUS #/AREA URNS HPF: 0 /HPF
TDI LATERAL: 0.15 M/S
TDI SEPTAL: 0.05 M/S
TDI: 0.1 M/S
TROPONIN I SERPL HS-MCNC: 8.8 PG/ML (ref 0–14.9)
URN SPEC COLLECT METH UR: ABNORMAL
UROBILINOGEN UR STRIP-ACNC: NEGATIVE EU/DL
WBC # BLD AUTO: 14.13 K/UL (ref 3.9–12.7)
WBC #/AREA URNS HPF: 0 /HPF (ref 0–5)
Z-SCORE OF LEFT VENTRICULAR DIMENSION IN END DIASTOLE: -2.63
Z-SCORE OF LEFT VENTRICULAR DIMENSION IN END SYSTOLE: -0.99

## 2023-09-08 PROCEDURE — 83735 ASSAY OF MAGNESIUM: CPT | Performed by: FAMILY MEDICINE

## 2023-09-08 PROCEDURE — 36415 COLL VENOUS BLD VENIPUNCTURE: CPT | Performed by: FAMILY MEDICINE

## 2023-09-08 PROCEDURE — 80175 DRUG SCREEN QUAN LAMOTRIGINE: CPT | Performed by: FAMILY MEDICINE

## 2023-09-08 PROCEDURE — 96375 TX/PRO/DX INJ NEW DRUG ADDON: CPT

## 2023-09-08 PROCEDURE — G0378 HOSPITAL OBSERVATION PER HR: HCPCS

## 2023-09-08 PROCEDURE — 85025 COMPLETE CBC W/AUTO DIFF WBC: CPT | Performed by: FAMILY MEDICINE

## 2023-09-08 PROCEDURE — 93306 TTE W/DOPPLER COMPLETE: CPT

## 2023-09-08 PROCEDURE — 96376 TX/PRO/DX INJ SAME DRUG ADON: CPT | Mod: 59

## 2023-09-08 PROCEDURE — 81001 URINALYSIS AUTO W/SCOPE: CPT | Performed by: FAMILY MEDICINE

## 2023-09-08 PROCEDURE — 95819 EEG AWAKE AND ASLEEP: CPT

## 2023-09-08 PROCEDURE — 80053 COMPREHEN METABOLIC PANEL: CPT | Performed by: FAMILY MEDICINE

## 2023-09-08 PROCEDURE — 63600175 PHARM REV CODE 636 W HCPCS: Performed by: FAMILY MEDICINE

## 2023-09-08 PROCEDURE — 84484 ASSAY OF TROPONIN QUANT: CPT | Performed by: FAMILY MEDICINE

## 2023-09-08 PROCEDURE — 80183 DRUG SCRN QUANT OXCARBAZEPIN: CPT | Performed by: FAMILY MEDICINE

## 2023-09-08 PROCEDURE — 93306 TTE W/DOPPLER COMPLETE: CPT | Mod: 26,,, | Performed by: INTERNAL MEDICINE

## 2023-09-08 PROCEDURE — 83036 HEMOGLOBIN GLYCOSYLATED A1C: CPT | Performed by: FAMILY MEDICINE

## 2023-09-08 PROCEDURE — 84132 ASSAY OF SERUM POTASSIUM: CPT | Mod: 59 | Performed by: FAMILY MEDICINE

## 2023-09-08 PROCEDURE — 93306 ECHO (CUPID ONLY): ICD-10-PCS | Mod: 26,,, | Performed by: INTERNAL MEDICINE

## 2023-09-08 PROCEDURE — 25000003 PHARM REV CODE 250: Performed by: FAMILY MEDICINE

## 2023-09-08 RX ORDER — POTASSIUM CHLORIDE 20 MEQ/1
20 TABLET, EXTENDED RELEASE ORAL
Status: DISCONTINUED | OUTPATIENT
Start: 2023-09-08 | End: 2023-09-08 | Stop reason: HOSPADM

## 2023-09-08 RX ORDER — POTASSIUM CHLORIDE 7.45 MG/ML
20 INJECTION INTRAVENOUS
Status: DISCONTINUED | OUTPATIENT
Start: 2023-09-08 | End: 2023-09-08 | Stop reason: HOSPADM

## 2023-09-08 RX ORDER — MAGNESIUM SULFATE HEPTAHYDRATE 40 MG/ML
2 INJECTION, SOLUTION INTRAVENOUS
Status: DISCONTINUED | OUTPATIENT
Start: 2023-09-08 | End: 2023-09-08 | Stop reason: HOSPADM

## 2023-09-08 RX ORDER — HYDROCODONE BITARTRATE AND ACETAMINOPHEN 5; 325 MG/1; MG/1
1 TABLET ORAL EVERY 4 HOURS PRN
Status: DISCONTINUED | OUTPATIENT
Start: 2023-09-08 | End: 2023-09-08 | Stop reason: HOSPADM

## 2023-09-08 RX ORDER — MORPHINE SULFATE 4 MG/ML
4 INJECTION, SOLUTION INTRAMUSCULAR; INTRAVENOUS ONCE
Status: COMPLETED | OUTPATIENT
Start: 2023-09-08 | End: 2023-09-08

## 2023-09-08 RX ORDER — POTASSIUM CHLORIDE 20 MEQ/1
40 TABLET, EXTENDED RELEASE ORAL
Status: DISCONTINUED | OUTPATIENT
Start: 2023-09-08 | End: 2023-09-08 | Stop reason: HOSPADM

## 2023-09-08 RX ORDER — HYDROCODONE BITARTRATE AND ACETAMINOPHEN 10; 325 MG/1; MG/1
1 TABLET ORAL EVERY 4 HOURS PRN
Status: DISCONTINUED | OUTPATIENT
Start: 2023-09-08 | End: 2023-09-08 | Stop reason: HOSPADM

## 2023-09-08 RX ORDER — POTASSIUM CHLORIDE 7.45 MG/ML
40 INJECTION INTRAVENOUS
Status: DISCONTINUED | OUTPATIENT
Start: 2023-09-08 | End: 2023-09-08 | Stop reason: HOSPADM

## 2023-09-08 RX ORDER — MAGNESIUM SULFATE HEPTAHYDRATE 40 MG/ML
4 INJECTION, SOLUTION INTRAVENOUS
Status: DISCONTINUED | OUTPATIENT
Start: 2023-09-08 | End: 2023-09-08 | Stop reason: HOSPADM

## 2023-09-08 RX ORDER — CLOBAZAM 10 MG/1
10 TABLET ORAL 2 TIMES DAILY
Status: DISCONTINUED | OUTPATIENT
Start: 2023-09-08 | End: 2023-09-08 | Stop reason: HOSPADM

## 2023-09-08 RX ORDER — CYCLOBENZAPRINE HCL 10 MG
10 TABLET ORAL 3 TIMES DAILY PRN
Qty: 12 TABLET | Refills: 0 | Status: SHIPPED | OUTPATIENT
Start: 2023-09-08 | End: 2023-09-18

## 2023-09-08 RX ORDER — MAGNESIUM SULFATE 1 G/100ML
1 INJECTION INTRAVENOUS
Status: DISCONTINUED | OUTPATIENT
Start: 2023-09-08 | End: 2023-09-08 | Stop reason: HOSPADM

## 2023-09-08 RX ADMIN — ONDANSETRON 4 MG: 2 INJECTION INTRAMUSCULAR; INTRAVENOUS at 02:09

## 2023-09-08 RX ADMIN — LORAZEPAM 2 MG: 2 INJECTION INTRAMUSCULAR; INTRAVENOUS at 05:09

## 2023-09-08 RX ADMIN — HYDROCODONE BITARTRATE AND ACETAMINOPHEN 1 TABLET: 5; 325 TABLET ORAL at 12:09

## 2023-09-08 RX ADMIN — HYDROCODONE BITARTRATE AND ACETAMINOPHEN 1 TABLET: 10; 325 TABLET ORAL at 12:09

## 2023-09-08 RX ADMIN — MORPHINE SULFATE 4 MG: 4 INJECTION, SOLUTION INTRAMUSCULAR; INTRAVENOUS at 02:09

## 2023-09-08 RX ADMIN — LAMOTRIGINE 300 MG: 100 TABLET ORAL at 08:09

## 2023-09-08 RX ADMIN — MORPHINE SULFATE 4 MG: 4 INJECTION, SOLUTION INTRAMUSCULAR; INTRAVENOUS at 08:09

## 2023-09-08 RX ADMIN — MORPHINE SULFATE 4 MG: 4 INJECTION, SOLUTION INTRAMUSCULAR; INTRAVENOUS at 04:09

## 2023-09-08 RX ADMIN — POTASSIUM CHLORIDE 40 MEQ: 1500 TABLET, EXTENDED RELEASE ORAL at 04:09
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
## 2023-09-08 NOTE — HOSPITAL COURSE
Patient monitor closely during observation stay.  She was placed on telemetry with no arrhythmias noted.  Seizure precautions in place.  Neurology consulted and recommends outpatient follow-up with Neurology.  She was initiated on Briviact 50 mg bid.  She is medically stable for discharge from a neurological standpoint.  No driving, No bathes, and swimming.

## 2023-09-08 NOTE — PHARMACY MED REC
"              .      Admission Medication History     The home medication history was taken by Brisa López.    You may go to "Admission" then "Reconcile Home Medications" tabs to review and/or act upon these items.     The home medication list has been updated by the Pharmacy department.   Please read ALL comments highlighted in yellow.   Please address this information as you see fit.    Feel free to contact us if you have any questions or require assistance.      The medications listed below were removed from the home medication list. Please reorder if appropriate:  Patient reports no longer taking the following medication(s):  Sominex 25 mg  Flonase  Lamictal 200 mg  Zyprexa 5 mg  Spronolactone 25 mg  Klonopin 1 mg          Medications listed below were obtained from: Patient/family and Analytic software- [employer]  No current facility-administered medications on file prior to encounter.     Current Outpatient Medications on File Prior to Encounter   Medication Sig Dispense Refill    cloBAZam (ONFI) 10 mg Tab Take 10 mg by mouth 2 (two) times daily.      QUEtiapine (SEROQUEL) 100 MG Tab Take 100 mg by mouth once daily.      risperiDONE (RISPERDAL) 0.25 MG Tab Take 0.25 mg by mouth every evening.      spironolactone (ALDACTONE) 50 MG tablet Take 50 mg by mouth once daily.      lamoTRIgine (LAMICTAL) 150 MG Tab Take 150 mg by mouth 2 (two) times daily. Take 2 tablets by mouth every morning and take 3 tablets every night      OXcarbazepine (TRILEPTAL) 300 MG Tab Take 600 mg by mouth 2 (two) times daily.      OXcarbazepine (TRILEPTAL) 600 MG Tab Take 1 tablet (600 mg total) by mouth 2 (two) times daily. 60 tablet 1    spironolactone (ALDACTONE) 25 MG tablet       [DISCONTINUED] carbinoxamine maleate 4 mg Tab Take by mouth.      [DISCONTINUED] clonazePAM (KLONOPIN) 1 MG tablet Take 0.5-1 mg by mouth daily as needed for Anxiety.      [DISCONTINUED] diphenhydrAMINE (SOMINEX) 25 mg tablet Diphenhydramine      " [DISCONTINUED] fluticasone propionate (FLONASE) 50 mcg/actuation nasal spray 2 sprays (100 mcg total) by Each Nostril route once daily. 9.9 mL 0    [DISCONTINUED] lamoTRIgine (LAMICTAL) 200 MG tablet       [DISCONTINUED] metFORMIN (GLUCOPHAGE-XR) 500 MG ER 24hr tablet Take 1,000 mg by mouth 2 (two) times daily.      [DISCONTINUED] OLANZapine (ZYPREXA) 5 MG tablet Take 5 mg by mouth nightly.         Potential issues to be addressed PRIOR TO DISCHARGE  Patient reported not taking the following medications: (Oxcarbazepine 300 mg, Oxcarbazepine 600 mg ). These medications remain on the home medication list. Please address accordingly.     Brisa López  [id number]
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
## 2023-09-08 NOTE — PLAN OF CARE
[facility]  Initial Discharge Assessment       Primary Care Provider: Argenis, Primary Doctor    Admission Diagnosis: Tachycardia [R00.0]    Admission Date: 9/7/2023  Expected Discharge Date:      met with Pt at bedside to complete discharge assessment. Pt AAOx4s. Demographics and insurance verified. Pt has no PCP but is establishing with a new on in October. Pt lives with significant other. No home health. No dialysis. Pt reports ability to complete ADLs without assistance. Pt verbalized plan to discharge home via family transport. Pt has no other needs to be addressed at this time.    Transition of Care Barriers: None    Payor: [employer] / Plan: [employer] CHOICE / Product Type: Commercial /     Extended Emergency Contact Information  Primary Emergency Contact: Baylee Roberts  Address: [address] [place] [address] [facility]  Home Phone: [phone]  Relation: Significant other  Secondary Emergency Contact: Kevin Dietz   [facility]  Home Phone: [phone]  Relation: Father    Discharge Plan A: Home with family  Discharge Plan B: Home with family      [id number] [place] - [address]  [address]  [place] [address]  Phone: [phone] Fax: [phone]    [facility] [address] [facility]  [id number] [place] [address]  Phone: [phone] Fax: [phone]    [id number] [place], [place] - [address] AT [facility] & [address]  [address]  [facility] [address]  Phone: [phone] Fax: [phone]      Initial Assessment (most recent)       Adult Discharge Assessment - 09/08/23 1045          Discharge Assessment    Assessment Type Discharge Planning Assessment     Confirmed/corrected address, phone number and insurance Yes     Confirmed Demographics Correct on Facesheet     Source of Information  patient     Communicated ANGELIA with patient/caregiver Date not available/Unable to determine     Reason For Admission Seizure     People in Home significant other     Facility Arrived From: Home     Do you expect to return to your current living situation? Yes     Do you have help at home or someone to help you manage your care at home? Yes     Who are your caregiver(s) and their phone number(s)? Baylee Roberts (Significant other)   [phone]     Prior to hospitilization cognitive status: Alert/Oriented     Current cognitive status: Alert/Oriented     Home Accessibility wheelchair accessible     Home Layout Able to live on 1st floor     Equipment Currently Used at Home none     Readmission within 30 days? No     Patient currently being followed by outpatient case management? No     Do you currently have service(s) that help you manage your care at home? No     Do you take prescription medications? Yes     Do you have prescription coverage? Yes     Coverage Payor:  [employer] - [employer] [facility]     Do you have any problems affording any of your prescribed medications? No     Is the patient taking medications as prescribed? yes     Who is going to help you get home at discharge? Baylee Roberts (Significant other)   [phone]     How do you get to doctors appointments? family or friend will provide     Are you on dialysis? No     Do you take coumadin? No     DME Needed Upon Discharge  none     Discharge Plan discussed with: Patient     Transition of Care Barriers None     Discharge Plan A Home with family     Discharge Plan B Home with family

## 2023-09-08 NOTE — PROCEDURES
EEG REPORT    NAME: Shaina Dietz  : 1990  MRN: [id number]    DATE of EE2023    CLINICAL INDICATION: This is a 33 y.o. female with history of seizure disorder and bipolar disorder being evaluated for breakthrough seizure.    MEDICATIONS:   cloBAZam  10 mg Oral BID    lamoTRIgine  300 mg Oral Daily    lamoTRIgine  450 mg Oral QHS    risperiDONE  0.25 mg Oral QHS         EEG DESCRIPTION:  A 16-channel EEG was performed with electrode placement in 10/20 International System. Longitudinal bipolar and referential montages were utilized in analysis.    This is a technically adequate study with minor muscle and motion artifacts.    The dominant posterior background rhythm was a well modulated, symmetric, synchronous, reactive, 8-9 Hz rhythm.    The record was reactive to eye opening and closure. Anterior derivations showed the expected admixture of low-voltage beta and theta frequencies as well as intermittent eye blink artifact.    Drowsiness was characterized by voltage attenuation and lateral eye movements.    Stage 2 sleep structures were not seen    Photic stimulation and Hyperventilation  was not performed.    No epileptiform discharges were recorded. No electrographic or electroclinical seizures were recorded.    INTERPRETATION:  This is a normal EEG recorded in awake and drowsy states.  No seizures or epileptiform activity noted.    Dick Benz MD  Neurology
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
## 2023-09-08 NOTE — PLAN OF CARE
Pt clear for DC from case management standpoint. Discharging to home.      Neurology office at [facility] to call with appt.    Patient stated that she has an upcomming PCP appt.        09/08/23 1541   Final Note   Assessment Type Final Discharge Note   Anticipated Discharge Disposition Home   What phone number can be called within the next 1-3 days to see how you are doing after discharge? [phone]

## 2023-09-12 LAB — LAMOTRIGINE SERPL-MCNC: 11.5 UG/ML (ref 2–20)

## 2023-09-13 LAB — OXCARBAZEPINE SERPL-MCNC: <1 UG/ML (ref 10–35)

## 2023-11-01 NOTE — ED TRIAGE NOTES
Billie prompt shared with pt   Shaina Dietz, a 32 y.o. female presents to the ED w/ complaint of     Triage note:  Chief Complaint   Patient presents with    Seizures     Tonic-clonic seizure lasted 5 minutes, missed one dose of lamotrigine yest. On seizure meds (Trileptal and Lamotrigine) for bipolar disorder. Hx of absent Sz in the past but unsure as to why she has these episodes. Pt bit her tongue, unsure if she voided on herself. Fully oriented upon arrival to the ED.     Review of patient's allergies indicates:  No Known Allergies  Past Medical History:   Diagnosis Date    Acne     Bipolar 2 disorder     Hx of migraine headaches     Patient identifiers for Shaina Dietz checked and correct.    LOC: The patient is awake, alert and aware of environment with an appropriate affect, the patient is oriented x 4 and speaking appropriately.    APPEARANCE: Patient resting comfortably and in no acute distress, patient is clean and well groomed, patient's clothing is properly fastened.    SKIN: The skin is warm and dry, color consistent with ethnicity, patient has normal skin turgor and moist mucus membranes, skin intact, no breakdown or bruising noted.    MUSCULOSKELETAL: Patient moving all extremities well, no obvious swelling or deformities noted.    RESPIRATORY: Airway is open and patent, respirations are spontaneous and even, patient has a normal effort and rate.    CARDIAC: Patient has a normal rate and rhythm, no periphreal edema noted, capillary refill < 3 seconds. Normal +2 pedal pulses present.    ABDOMEN: Soft and non tender to palpation, no distention noted. Patient denies any nausea, vomiting, diarrhea, or constipation.     NEUROLOGIC: Eyes open spontaneously, PERRL, behavior appropriate to situation, follows commands, facial expression symmetrical, bilateral hand grasp equal and even, purposeful motor response noted, normal sensation in all extremities.     Allergies reported: Review of patient's allergies indicates:  No Known  Allergies

## 2024-03-28 ENCOUNTER — OFFICE VISIT (OUTPATIENT)
Dept: PRIMARY CARE CLINIC | Facility: CLINIC | Age: 34
End: 2024-03-28
Payer: COMMERCIAL

## 2024-03-28 VITALS
TEMPERATURE: 98 F | OXYGEN SATURATION: 98 % | BODY MASS INDEX: 38.6 KG/M2 | SYSTOLIC BLOOD PRESSURE: 116 MMHG | WEIGHT: 231.69 LBS | DIASTOLIC BLOOD PRESSURE: 84 MMHG | HEIGHT: 65 IN | HEART RATE: 104 BPM

## 2024-03-28 DIAGNOSIS — R35.0 URINARY FREQUENCY: ICD-10-CM

## 2024-03-28 DIAGNOSIS — Z00.00 ROUTINE MEDICAL EXAM: Primary | ICD-10-CM

## 2024-03-28 DIAGNOSIS — Z11.59 NEED FOR HEPATITIS C SCREENING TEST: ICD-10-CM

## 2024-03-28 DIAGNOSIS — F31.81 BIPOLAR 2 DISORDER: ICD-10-CM

## 2024-03-28 DIAGNOSIS — G40.909 SEIZURE DISORDER: ICD-10-CM

## 2024-03-28 DIAGNOSIS — E66.9 OBESITY (BMI 35.0-39.9 WITHOUT COMORBIDITY): ICD-10-CM

## 2024-03-28 PROBLEM — Z86.018 HISTORY OF UTERINE FIBROID: Status: RESOLVED | Noted: 2019-08-04 | Resolved: 2024-03-28

## 2024-03-28 LAB
BILIRUB UR QL STRIP: NEGATIVE
CLARITY UR REFRACT.AUTO: CLEAR
COLOR UR AUTO: YELLOW
GLUCOSE UR QL STRIP: NEGATIVE
HGB UR QL STRIP: NEGATIVE
KETONES UR QL STRIP: ABNORMAL
LEUKOCYTE ESTERASE UR QL STRIP: NEGATIVE
NITRITE UR QL STRIP: NEGATIVE
PH UR STRIP: 6 [PH] (ref 5–8)
PROT UR QL STRIP: NEGATIVE
SP GR UR STRIP: 1.02 (ref 1–1.03)
URN SPEC COLLECT METH UR: ABNORMAL

## 2024-03-28 PROCEDURE — 81003 URINALYSIS AUTO W/O SCOPE: CPT | Performed by: INTERNAL MEDICINE

## 2024-03-28 PROCEDURE — 99385 PREV VISIT NEW AGE 18-39: CPT | Mod: S$GLB,,, | Performed by: INTERNAL MEDICINE

## 2024-03-28 PROCEDURE — 99999 PR PBB SHADOW E&M-EST. PATIENT-LVL IV: CPT | Mod: PBBFAC,,, | Performed by: INTERNAL MEDICINE

## 2024-03-28 PROCEDURE — 1160F RVW MEDS BY RX/DR IN RCRD: CPT | Mod: CPTII,S$GLB,, | Performed by: INTERNAL MEDICINE

## 2024-03-28 PROCEDURE — 1159F MED LIST DOCD IN RCRD: CPT | Mod: CPTII,S$GLB,, | Performed by: INTERNAL MEDICINE

## 2024-03-28 PROCEDURE — 3079F DIAST BP 80-89 MM HG: CPT | Mod: CPTII,S$GLB,, | Performed by: INTERNAL MEDICINE

## 2024-03-28 PROCEDURE — 3008F BODY MASS INDEX DOCD: CPT | Mod: CPTII,S$GLB,, | Performed by: INTERNAL MEDICINE

## 2024-03-28 PROCEDURE — 3074F SYST BP LT 130 MM HG: CPT | Mod: CPTII,S$GLB,, | Performed by: INTERNAL MEDICINE

## 2024-03-28 RX ORDER — CLONAZEPAM 1 MG/1
1 TABLET ORAL 3 TIMES DAILY PRN
COMMUNITY

## 2024-03-28 RX ORDER — ZONISAMIDE 100 MG/1
300 CAPSULE ORAL NIGHTLY
COMMUNITY

## 2024-03-28 NOTE — PROGRESS NOTES
Subjective     Patient ID: Shaina Dietz is a 33 y.o. female.    Chief Complaint: Establish Care    Seen once by previous [facility] PCP five years ago. PCP outside [facility] about six months ago. Presents to establish care with provider closer to home, for annual physical. No acute complaints.     PMH: G0.  Seizure Disorder, outside Neurologist.   Bipolar 2 Disorder managed by Psychiatrist.   Migraine Headaches.  H/O Thrombocytosis.  Sinus Tachycardia.  Obesity, TSH normal 1.67 Sep. '23.  HbA1c normal 4.7% Sep. '23.  Seasonal Allergic Rhinitis.  Adult Acne.  Pap normal 1/21. Vaccines reviewed.     PSH: Tonsillectomy. Hysterectomy due to fibroids, ovaries intact.     Social: brief tobacco use, quit 2019. No alcohol. Lives with partner of four years. 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 Regular diet, 3 cups coffee daily. Walks for exercise three days a week.    FMH: HTN, Cholesterol, Arrhythmia, Stroke, Breast cancer, Non-Hodgkin's Lymphoma, Depression, Alcohol abuse.     NKDA.    Medications: Brivaracetam, Clonazepam, Lamotrigine, Risperdone, Zonisamide, Quetiapine. Flonase prn, Multivitamin, Vit D 1,000 daily, Fish oil caps, occasional TUMS, Tylenol or Ibuprofen prn headache.      Review of Systems   Constitutional:  Negative for activity change, appetite change, fatigue, fever and unexpected weight change.   HENT:  Negative for nasal congestion, ear pain, hearing loss, rhinorrhea, sneezing, sore throat, trouble swallowing and voice change.    Eyes:  Negative for pain and visual disturbance.   Respiratory:  Negative for cough, chest tightness, shortness of breath and wheezing.    Cardiovascular:  Negative for chest pain, palpitations and leg swelling.   Gastrointestinal:  Negative for abdominal pain, blood in stool, constipation, diarrhea, nausea and vomiting.   Genitourinary:  Positive for frequency. Negative for bladder incontinence, difficulty urinating, dysuria, flank pain,  "hematuria, nocturia, pelvic pain and urgency.   Musculoskeletal:  Negative for arthralgias, gait problem, joint swelling and myalgias.   Integumentary:  Negative for color change and rash.   Neurological:  Negative for dizziness, syncope, facial asymmetry, speech difficulty, weakness and numbness.   Hematological:  Negative for adenopathy. Does not bruise/bleed easily.   Psychiatric/Behavioral:  Negative for decreased concentration, dysphoric mood and sleep disturbance. The patient is not nervous/anxious.           Objective   Vitals:    03/28/24 0736   BP: 116/84   BP Location: Right arm   Patient Position: Sitting   Pulse: 104   Temp: 98 °F (36.7 °C)   TempSrc: Oral   SpO2: 98%   Weight: 105.1 kg (231 lb 11.3 oz)   Height: 5' 5" (1.651 m)   BMI=38.6  Physical Exam  Vitals reviewed.   Constitutional:       General: She is not in acute distress.     Appearance: She is well-developed. She is not ill-appearing or diaphoretic.   HENT:      Head: Normocephalic and atraumatic.      Right Ear: Tympanic membrane and ear canal normal.      Left Ear: Tympanic membrane and ear canal normal.      Nose: Nose normal. No congestion.      Mouth/Throat:      Mouth: Mucous membranes are moist.      Pharynx: Oropharynx is clear.   Eyes:      General: No scleral icterus.     Extraocular Movements: Extraocular movements intact.      Conjunctiva/sclera: Conjunctivae normal.      Right eye: Right conjunctiva is not injected.      Left eye: Left conjunctiva is not injected.      Pupils: Pupils are equal, round, and reactive to light.   Neck:      Thyroid: No thyromegaly.      Vascular: No carotid bruit or JVD.   Cardiovascular:      Rate and Rhythm: Normal rate and regular rhythm.      Pulses: Normal pulses.      Heart sounds: Normal heart sounds. No murmur heard.     No friction rub. No gallop.   Pulmonary:      Effort: Pulmonary effort is normal. No respiratory distress.      Breath sounds: Normal breath sounds. No wheezing, rhonchi or " rales.   Abdominal:      General: Bowel sounds are normal. There is no distension.      Palpations: Abdomen is soft. There is no mass.      Tenderness: There is no abdominal tenderness.   Musculoskeletal:         General: No tenderness or deformity. Normal range of motion.      Cervical back: Normal range of motion and neck supple.      Right lower leg: No edema.      Left lower leg: No edema.      Comments: Severe crepitus in left knee without effusion, tenderness or pain on range of motion.   Lymphadenopathy:      Cervical: No cervical adenopathy.   Skin:     General: Skin is warm and dry.      Coloration: Skin is not pale.      Findings: No erythema or rash.      Nails: There is no clubbing.   Neurological:      General: No focal deficit present.      Mental Status: She is alert and oriented to person, place, and time.      Cranial Nerves: No cranial nerve deficit.      Motor: No weakness or abnormal muscle tone.      Coordination: Coordination normal.      Gait: Gait normal.   Psychiatric:         Mood and Affect: Mood and affect normal.         Speech: Speech normal.         Behavior: Behavior normal.         Thought Content: Thought content normal.         Judgment: Judgment normal.          Assessment and Plan   Routine medical exam  -     CBC Auto Differential; Future; Expected date: 03/28/2024  -     Comprehensive Metabolic Panel; Future; Expected date: 03/28/2024  -     Lipid Panel; Future; Expected date: 03/28/2024    Urinary frequency  -     Urinalysis, Reflex to Urine Culture Urine, Clean Catch    Bipolar 2 disorder        -     stable, f/u with Psychiatry.     Seizure disorder        -     stable, f/u with Neurology.    Obesity (BMI 35.0-39.9 without comorbidity)        -     encouraged reduced calorie diet and increased physical activity for weight reduction.     Need for hepatitis C screening test  -     Hepatitis C Antibody; Future; Expected date: 03/28/2024    She is walking in the [employer] in two days, COVID booster deferred until after due to side effects.      Follow up in about 1 year (around 3/28/2025).

## 2024-03-29 ENCOUNTER — PATIENT MESSAGE (OUTPATIENT)
Dept: PRIMARY CARE CLINIC | Facility: CLINIC | Age: 34
End: 2024-03-29
Payer: COMMERCIAL

## 2024-04-06 ENCOUNTER — LAB VISIT (OUTPATIENT)
Dept: LAB | Facility: HOSPITAL | Age: 34
End: 2024-04-06
Attending: INTERNAL MEDICINE
Payer: COMMERCIAL

## 2024-04-06 DIAGNOSIS — Z11.59 NEED FOR HEPATITIS C SCREENING TEST: ICD-10-CM

## 2024-04-06 DIAGNOSIS — Z00.00 ROUTINE MEDICAL EXAM: ICD-10-CM

## 2024-04-06 LAB
ALBUMIN SERPL BCP-MCNC: 3.9 G/DL (ref 3.5–5.2)
ALP SERPL-CCNC: 86 U/L (ref 55–135)
ALT SERPL W/O P-5'-P-CCNC: 14 U/L (ref 10–44)
ANION GAP SERPL CALC-SCNC: 9 MMOL/L (ref 8–16)
AST SERPL-CCNC: 15 U/L (ref 10–40)
BASOPHILS # BLD AUTO: 0.06 K/UL (ref 0–0.2)
BASOPHILS NFR BLD: 0.8 % (ref 0–1.9)
BILIRUB SERPL-MCNC: 0.3 MG/DL (ref 0.1–1)
BUN SERPL-MCNC: 16 MG/DL (ref 6–20)
CALCIUM SERPL-MCNC: 10 MG/DL (ref 8.7–10.5)
CHLORIDE SERPL-SCNC: 108 MMOL/L (ref 95–110)
CHOLEST SERPL-MCNC: 179 MG/DL (ref 120–199)
CHOLEST/HDLC SERPL: 4.6 {RATIO} (ref 2–5)
CO2 SERPL-SCNC: 21 MMOL/L (ref 23–29)
CREAT SERPL-MCNC: 1 MG/DL (ref 0.5–1.4)
DIFFERENTIAL METHOD BLD: NORMAL
EOSINOPHIL # BLD AUTO: 0.2 K/UL (ref 0–0.5)
EOSINOPHIL NFR BLD: 2.1 % (ref 0–8)
ERYTHROCYTE [DISTWIDTH] IN BLOOD BY AUTOMATED COUNT: 13.6 % (ref 11.5–14.5)
EST. GFR  (NO RACE VARIABLE): >60 ML/MIN/1.73 M^2
GLUCOSE SERPL-MCNC: 102 MG/DL (ref 70–110)
HCT VFR BLD AUTO: 43.1 % (ref 37–48.5)
HCV AB SERPL QL IA: NORMAL
HDLC SERPL-MCNC: 39 MG/DL (ref 40–75)
HDLC SERPL: 21.8 % (ref 20–50)
HGB BLD-MCNC: 14.4 G/DL (ref 12–16)
IMM GRANULOCYTES # BLD AUTO: 0.02 K/UL (ref 0–0.04)
IMM GRANULOCYTES NFR BLD AUTO: 0.3 % (ref 0–0.5)
LDLC SERPL CALC-MCNC: 118 MG/DL (ref 63–159)
LYMPHOCYTES # BLD AUTO: 2.3 K/UL (ref 1–4.8)
LYMPHOCYTES NFR BLD: 31.2 % (ref 18–48)
MCH RBC QN AUTO: 30.7 PG (ref 27–31)
MCHC RBC AUTO-ENTMCNC: 33.4 G/DL (ref 32–36)
MCV RBC AUTO: 92 FL (ref 82–98)
MONOCYTES # BLD AUTO: 0.5 K/UL (ref 0.3–1)
MONOCYTES NFR BLD: 6.2 % (ref 4–15)
NEUTROPHILS # BLD AUTO: 4.4 K/UL (ref 1.8–7.7)
NEUTROPHILS NFR BLD: 59.4 % (ref 38–73)
NONHDLC SERPL-MCNC: 140 MG/DL
NRBC BLD-RTO: 0 /100 WBC
PLATELET # BLD AUTO: 339 K/UL (ref 150–450)
PMV BLD AUTO: 10 FL (ref 9.2–12.9)
POTASSIUM SERPL-SCNC: 4.7 MMOL/L (ref 3.5–5.1)
PROT SERPL-MCNC: 7.5 G/DL (ref 6–8.4)
RBC # BLD AUTO: 4.69 M/UL (ref 4–5.4)
SODIUM SERPL-SCNC: 138 MMOL/L (ref 136–145)
TRIGL SERPL-MCNC: 110 MG/DL (ref 30–150)
WBC # BLD AUTO: 7.46 K/UL (ref 3.9–12.7)

## 2024-04-06 PROCEDURE — 80053 COMPREHEN METABOLIC PANEL: CPT | Performed by: INTERNAL MEDICINE

## 2024-04-06 PROCEDURE — 36415 COLL VENOUS BLD VENIPUNCTURE: CPT | Mod: PO | Performed by: INTERNAL MEDICINE

## 2024-04-06 PROCEDURE — 80061 LIPID PANEL: CPT | Performed by: INTERNAL MEDICINE

## 2024-04-06 PROCEDURE — 85025 COMPLETE CBC W/AUTO DIFF WBC: CPT | Performed by: INTERNAL MEDICINE

## 2024-04-06 PROCEDURE — 86803 HEPATITIS C AB TEST: CPT | Performed by: INTERNAL MEDICINE

## 2024-04-07 ENCOUNTER — PATIENT MESSAGE (OUTPATIENT)
Dept: PRIMARY CARE CLINIC | Facility: CLINIC | Age: 34
End: 2024-04-07
Payer: COMMERCIAL

## 2024-05-05 ENCOUNTER — PATIENT MESSAGE (OUTPATIENT)
Dept: PRIMARY CARE CLINIC | Facility: CLINIC | Age: 34
End: 2024-05-05
Payer: COMMERCIAL

## 2024-05-13 ENCOUNTER — TELEPHONE (OUTPATIENT)
Dept: PRIMARY CARE CLINIC | Facility: CLINIC | Age: 34
End: 2024-05-13
Payer: COMMERCIAL

## 2024-05-13 NOTE — TELEPHONE ENCOUNTER
Pt scheduled for virtual to discuss Keto diet recommended by Neuro doc, possible ref to Nutrition.

## 2024-05-21 ENCOUNTER — OFFICE VISIT (OUTPATIENT)
Dept: PRIMARY CARE CLINIC | Facility: CLINIC | Age: 34
End: 2024-05-21
Payer: COMMERCIAL

## 2024-05-21 DIAGNOSIS — E66.9 OBESITY (BMI 35.0-39.9 WITHOUT COMORBIDITY): ICD-10-CM

## 2024-05-21 DIAGNOSIS — G40.909 SEIZURE DISORDER: Primary | ICD-10-CM

## 2024-05-21 PROCEDURE — 1159F MED LIST DOCD IN RCRD: CPT | Mod: CPTII,95,, | Performed by: INTERNAL MEDICINE

## 2024-05-21 PROCEDURE — 99213 OFFICE O/P EST LOW 20 MIN: CPT | Mod: 95,,, | Performed by: INTERNAL MEDICINE

## 2024-05-21 PROCEDURE — 1160F RVW MEDS BY RX/DR IN RCRD: CPT | Mod: CPTII,95,, | Performed by: INTERNAL MEDICINE

## 2024-05-27 NOTE — PROGRESS NOTES
Subjective     Patient ID: Shaina Dietz is a 33 y.o. female.    Chief Complaint: Seizures    The patient location is: [place].  The chief complaint leading to consultation is: Seizures.    Visit type: audiovisual    Face to Face time with patient: 7 minutes.  10 minutes of total time spent on the encounter, which includes face to face time and non-face to face time preparing to see the patient (eg, review of tests), Obtaining and/or reviewing separately obtained history, Documenting clinical information in the electronic or other health record, Independently interpreting results (not separately reported) and communicating results to the patient/family/caregiver, or Care coordination (not separately reported).     Each patient to whom he or she provides medical services by telemedicine is:  (1) informed of the relationship between the physician and patient and the respective role of any other health care provider with respect to management of the patient; and (2) notified that he or she may decline to receive medical services by telemedicine and may withdraw from such care at any time.    Notes:   Presents to discuss Ketogenic diet recommended by her Neurologist to help control drug resistant Epilepsy. Requesting Nutrition referral to help plan the diet, and requesting that I monitor her cholesterol. Baseline Lipids are normal: TChol 179, , HDL 39, . No other concerns at this time.    PMH: G0.  Seizure Disorder, outside Neurologist.   Bipolar 2 Disorder managed by Psychiatrist.   Migraine Headaches.  H/O Thrombocytosis.  Sinus Tachycardia.  Obesity, TSH normal 1.67 Sep. '23.  HbA1c normal 4.7% Sep. '23.  Seasonal Allergic Rhinitis.  Adult Acne.  Pap normal 1/21. Vaccines reviewed.     PSH: Tonsillectomy. Hysterectomy due to fibroids, ovaries intact.     Social: brief tobacco use, quit 2019. No alcohol. Lives with partner of four years. 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 Regular diet, 3 cups coffee daily. Walks for exercise three days a week.    FMH: HTN, Cholesterol, Arrhythmia, Stroke, Breast cancer, Non-Hodgkin's Lymphoma, Depression, Alcohol abuse.     NKDA.    Medications: list reviewed.      Review of Systems   Constitutional:  Negative for activity change.   HENT:  Negative for hearing loss and trouble swallowing.    Eyes:  Negative for discharge.   Respiratory:  Negative for chest tightness and wheezing.    Cardiovascular:  Negative for chest pain and palpitations.   Gastrointestinal:  Negative for constipation, diarrhea and vomiting.   Genitourinary:  Negative for difficulty urinating and hematuria.   Neurological:  Positive for headaches.   Psychiatric/Behavioral:  Negative for dysphoric mood.           Objective     Physical Exam  Constitutional:       General: She is not in acute distress.  Pulmonary:      Effort: Pulmonary effort is normal. No respiratory distress.   Neurological:      Mental Status: She is alert.   Psychiatric:         Mood and Affect: Mood normal.         Behavior: Behavior normal.            Assessment and Plan     1. Seizure disorder  -     Ambulatory referral/consult to Nutrition Services; Future; Expected date: 05/28/2024    2. Obesity (BMI 35.0-39.9 without comorbidity)  -     Ambulatory referral/consult to Nutrition Services; Future; Expected date: 05/28/2024  -     Lipid Panel; Future; Expected date: 05/21/2024 - I recommend 6 month follow up from the start of the diet.         No follow-ups on file.

## 2024-06-03 ENCOUNTER — OFFICE VISIT (OUTPATIENT)
Dept: NEUROLOGY | Facility: CLINIC | Age: 34
End: 2024-06-03
Payer: COMMERCIAL

## 2024-06-03 DIAGNOSIS — G40.209 PARTIAL SYMPTOMATIC EPILEPSY WITH COMPLEX PARTIAL SEIZURES, NOT INTRACTABLE, WITHOUT STATUS EPILEPTICUS: Primary | ICD-10-CM

## 2024-06-03 DIAGNOSIS — F10.11 ALCOHOL USE DISORDER, MILD, IN SUSTAINED REMISSION: ICD-10-CM

## 2024-06-03 DIAGNOSIS — F43.23 ADJUSTMENT DISORDER WITH MIXED ANXIETY AND DEPRESSED MOOD: ICD-10-CM

## 2024-06-03 DIAGNOSIS — F31.81 BIPOLAR 2 DISORDER: ICD-10-CM

## 2024-06-03 PROCEDURE — 99499 UNLISTED E&M SERVICE: CPT | Mod: 95,,, | Performed by: STUDENT IN AN ORGANIZED HEALTH CARE EDUCATION/TRAINING PROGRAM

## 2024-06-03 PROCEDURE — 90791 PSYCH DIAGNOSTIC EVALUATION: CPT | Mod: 95,,, | Performed by: STUDENT IN AN ORGANIZED HEALTH CARE EDUCATION/TRAINING PROGRAM
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
## 2024-07-03 ENCOUNTER — OFFICE VISIT (OUTPATIENT)
Dept: NEUROLOGY | Facility: CLINIC | Age: 34
End: 2024-07-03
Payer: COMMERCIAL

## 2024-07-03 DIAGNOSIS — G40.209 PARTIAL SYMPTOMATIC EPILEPSY WITH COMPLEX PARTIAL SEIZURES, NOT INTRACTABLE, WITHOUT STATUS EPILEPTICUS: Primary | ICD-10-CM

## 2024-07-03 DIAGNOSIS — F06.70 MILD NEUROCOGNITIVE DISORDER DUE TO ANOTHER MEDICAL CONDITION: ICD-10-CM

## 2024-07-03 DIAGNOSIS — F10.11 ALCOHOL USE DISORDER, MILD, IN SUSTAINED REMISSION: ICD-10-CM

## 2024-07-03 DIAGNOSIS — F31.81 BIPOLAR 2 DISORDER: ICD-10-CM

## 2024-07-03 DIAGNOSIS — F43.23 ADJUSTMENT DISORDER WITH MIXED ANXIETY AND DEPRESSED MOOD: ICD-10-CM

## 2024-07-03 PROCEDURE — 99499 UNLISTED E&M SERVICE: CPT | Mod: S$GLB,,, | Performed by: STUDENT IN AN ORGANIZED HEALTH CARE EDUCATION/TRAINING PROGRAM

## 2024-07-03 PROCEDURE — 96133 NRPSYC TST EVAL PHYS/QHP EA: CPT | Mod: S$GLB,,, | Performed by: STUDENT IN AN ORGANIZED HEALTH CARE EDUCATION/TRAINING PROGRAM

## 2024-07-03 PROCEDURE — 96138 PSYCL/NRPSYC TECH 1ST: CPT | Mod: S$GLB,,, | Performed by: STUDENT IN AN ORGANIZED HEALTH CARE EDUCATION/TRAINING PROGRAM

## 2024-07-03 PROCEDURE — 96139 PSYCL/NRPSYC TST TECH EA: CPT | Mod: S$GLB,,, | Performed by: STUDENT IN AN ORGANIZED HEALTH CARE EDUCATION/TRAINING PROGRAM

## 2024-07-03 PROCEDURE — 96132 NRPSYC TST EVAL PHYS/QHP 1ST: CPT | Mod: S$GLB,,, | Performed by: STUDENT IN AN ORGANIZED HEALTH CARE EDUCATION/TRAINING PROGRAM

## 2024-07-03 PROCEDURE — 99999 PR PBB SHADOW E&M-EST. PATIENT-LVL II: CPT | Mod: PBBFAC,,, | Performed by: STUDENT IN AN ORGANIZED HEALTH CARE EDUCATION/TRAINING PROGRAM

## 2024-07-10 DIAGNOSIS — M25.562 LEFT KNEE PAIN, UNSPECIFIED CHRONICITY: Primary | ICD-10-CM

## 2024-07-15 NOTE — PROGRESS NOTES
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 Martins thought processes appeared logical, sequential, and goal oriented. There was no evidence of hallucination or delusions. Insight and judgment appeared intact. She had some difficulties with autobiographical memory, in particular accurately recalling her timeline since the onset of seizures in 2019.     Mood/affect:  Rapport was easy to establish and maintain. Her affect was broad in range, appeared euthymic, and was consistent with stated mood. Behavior was within normal limits.    Testing Behavioral Observations 07/03/2024     During testing, Ms. Dietz often sought clarification to be sure she was performing tasks correctly. With this scaffolding she adequately understood and retained task instructions, completed tasks at a steady pace pace, and tolerated testing without the need for atypical breaks or accommodation. However, on tasks of memory performance slowed and she became hesitant about responses. This was particularly evident on a verbal list-learning task during which she became frustrated and tearful. Similar frustration and subjective concern about her mathematical abilities were  articulated to the examiner during a mental arithmetic task. Ms. Dietz otherwise appeared relaxed, she self-monitored and appropriately corrected errors, and she tolerated testing well.     Results     Tests Administered (Manual norms used unless otherwise indicated): [employer] (ACS) - Test of Premorbid Functioning (TOPF), TOMM, Wechsler Adult Intelligence Scale 4th Ed. (WAIS-IV), Controlled Oral Word Association Test (COWAT; [facility] norms), Semantic Fluency (Animals; [facility] norms), [place] Naming Test (BNT; [facility] norms), Gilbert Auditory Verbal Learning Test (RAVLT), Wechsler Memory Scale, 4th Ed. Logical Memory (WMS IV-LM), Wechsler Memory Scale, 4th Ed. Visual Reproduction (WMS IV-VR), ACS Faces, Gilbert-Osterrieth Complex Figure Test (RCFT) Copy trial, [place] Card Sorting Test (WCST), Trail Making Test (TMT A/B; [facility] norms), Grooved Pegboard Test ([facility] norms), [facility] Affect Naming, Personality Assessment Inventory (ADE), and Quality of Life in Epilepsy 31-item scale.    Score Label T-Score Standard Score Z-Score Scaled Score %ile Rank   Exceptionally High > 70 > 130 > 2.0  > 16 > 98   Above Average 64-69 120-129 1.4-1.9 15 91-97   High Average 57-63 110-119 0.7-1.3 12-14 75-90   Average 44-56  0.6 to -0.6 8-11 25-74   Low Average 37-43 80-89 -1.3 to -0.7 6-7 9-24   Below Average 30-36 70-79 -2.0 to -1.4 4-5 2-8   Exceptionally Low < 30 < 70  < -2.0 < 4 < 2      Performance Validity: Ms. Dietz completed both stand-alone and embedded measures of task engagement. Below-cutoff performance on any one stand-alone measure and/ or any two embedded measures of task engagement is highly unlikely to occur outside the context of poor or inconsistent effort during testing. Ms. Dietz scored above predetermined cutoffs on all administered measures of task engagement. As such, there is no evidence to suggest poor or inconsistent engagement and their performance is deemed to be a reasonable reflection of  their day-to-day cognitive status.     PREMORBID FUNCTIONING Raw Score Type of Standardized Score Standardized Score Percentile/CP Descriptor   TOPF simple dem. eFSIQ -  79 High Average   TOPF pred. eFSIQ -  77 High Average   TOPF simple + pred. eFSIQ -  79 High Average   INTELLECTUAL FUNCTIONING Raw Score Type of Standardized Score Standardized Score Percentile/CP Descriptor   WAIS-IV         VCI -  79 High Average   ODILON -  55 Average   WMI - SS 77 6 Below Average   PSI - SS 97 42 Average   FSIQ - SS 99 47 Average   GAI - ss 107 68 Average   LANGUAGE FUNCTIONING Raw Score Type of Standardized Score Standardized Score Percentile/CP Descriptor   WAIS-IV Vocabulary 50 ss 14 91 Above Average   WAIS-IV Information 16 ss 12 75 High Average   TOPF Word Reading 56  82 High Average   BNT-60 54 Tscore 36 8 Below Average   FAS 54 Tscore 53 62 Average   Animal Naming 20 Tscore 35 7 Below Average   Auditory Naming RT 0.66 zscore 1.0 85 High Average   Auditory Naming TC 49 zscore -0.58 28 Average   Auditory Naming TOT 4 zscore -0.55 29 Average   VISUOSPATIAL FUNCTIONING Raw Score Type of Standardized Score Standardized Score Percentile/CP Descriptor   WAIS-IV Block Design 28 ss 7 16 Low Average   WAIS-IV Visual Puzzles 10 ss 7 16 Low Average   RCFT Copy 35 - - >16 WNL   RCFT Time to Copy 448 - - <1 Exceptionally Low   VR Copy 42 - - 26-50 Average   LEARNING & MEMORY Raw Score Type of Standardized Score Standardized Score Percentile/CP Descriptor   RAVLT         Trial 1 7 Tscore 52 58 Average   Trial 2 10 Tscore 46 34 Average   Trial 3 10 Tscore 37 9 Low Average   Trial 4 12 Tscore 41 18 Low Average   Trial 5 11 Tscore 34 5 Below Average   Trials 1-5 Total 50 Tscore 39 14 Low Average   List B 7 Tscore 52 58 Average   Trial 6 (short delay) 5 Tscore 23 0.3 Exceptionally Low    30-min Recall (long delay) 8 Tscore 34 5 Below Average   Recognition Hits 13 Tscore - - -   Recognition FP Errors 0  Tscore - - -   Recognition Percentage Correct - Tscore 44 27 Average   WMS-IV Subtests         LM I 33 ss 13 84 High Average   LM II 25 ss 11 63 Average   LM Recognition 23 - - 26-50 Average   VR I 28 ss 5 5 Below Average   VR II 18 ss 7 16 Low Average   VR II Recognition 5 - - 17-25 Low Average   ACS Social Cognition         Faces I 99 ss 10 50 Average   Faces II 30 ss 13 84 High Average   Faces Content 48 ss 14 91 Above Average   Faces Spatial 45 ss 9 37 Average   ATTENTION/WORKING MEMORY Raw Score Type of Standardized Score Standardized Score Percentile/CP Descriptor   WAIS-IV Digit Span 25 ss 8 25 Average         DS Forward 10 ss 9 37 Average         DS Backward 8 ss 9 37 Average         DS Sequence 7 ss 8 25 Average         Longest Digit Forward 7 - - - -         Longest Digit Backward 4 - - - -         Longest Digit Sequence 5 - - - -   WAIS-IV Arithmetic 7 ss 4 2 Below Average   MENTAL PROCESSING SPEED Raw Score Type of Standardized Score Standardized Score Percentile/CP Descriptor   WAIS-IV Symbol Search 29 ss 8 25 Average   WAIS-IV Coding 77 ss 11 63 Average   TMT A  27 Tscore 39 14 Low Average   TMT A errors 0 - - - -   EXECUTIVE FUNCTIONING Raw Score Type of Standardized Score Standardized Score Percentile/CP Descriptor   TMT B 89 Tscore 26 1 Exceptionally Low    TMT B errors 1 - - - -   WCST-128 N/A        Total Correct 43   - -   Total Errors 85 SS 55 0.1 Exceptionally Low    Perseverative Resp. 16 SS 80 9 Low Average   Perseverative Err. 16 SS 78 7 Below Average   Nonperseverative Err. 69 SS 55 0.1 Exceptionally Low    Concept. Level Response 16% SS 55 0.1 Exceptionally Low    Categories Completed 1 - - 2-5 Below Average   FMS 0 - - N/A N/A   Learning to Learn N/A - - N/A N/A   WAIS-IV Similarities 28 ss 11 63 Average   WAIS-IV Matrix Reasoning 25 ss 17 99 Exceptionally High   FRONTOMOTOR  Raw Score Type of Standardized Score Standardized Score Percentile/CP Descriptor   GPT DH 60 Tscore 45 31 Average    GPD ND 63 Tscore 51 54 Average   MOOD & PERSONALITY Raw Score Type of Standardized Score Standardized Score Percentile/CP Descriptor   BDI-2 8 - - - Minimal   CELI 6 - - - Minimal   QOLIE-31         Seizure Worry 45.32 Tscore 45 31 WNL   Overall Quality of Life 62.5 Tscore 47 40 WNL   Emotional Well-Being 64 Tscore 48 43 WNL   Energy/Fatigue 25 Tscore 36 8 Moderate   Cognitive 45.55 Tscore 44 26 WNL   Medication Effects 25.00 Tscore 40 16 WNL   Social Function 23 Tscore 34 5 Moderate   Overall Score - Tscore 38 11 Moderate   ss = scaled score (mean = 10, SD = 3); SS = standard score (mean = 100, SD = 15); Tscore mean = 50, SD = 10; zscore (mean = 0.00, SD = 1)       Billing Documentation     Time spent conducting via tele-health (audio and video) interview with the patient: 75 minutes; billed separately.  Time on review of neuropsychological test data and relevant records, data interpretation, providing feedback about these results, and writing/ documentation: 312 minutes; [id number] &[id number] (x4).  Psychometrist time spent in the administration and scoring of 2 or more neuropsychological tests 335 minutes; 47065 & 38246 (x10).     Referral Diagnoses:   G40.209 (ICD-10-CM) - Partial symptomatic epilepsy with complex partial seizures, not intractable, without status epilepticus     Signatures     Thank you for the opportunity to assist in the care of your patient. Please do not hesitate to contact me at [phone] or via Epic staff message if I can be of further assistance.          _____________________  Ezra Swan, Ph.D.  Neuropsychologist  Department of Neuropsychology  [facility], [place]

## 2024-07-16 NOTE — PROGRESS NOTES
HPI     Last eye exam x year ago.  Patient states notice a little change in vision at dist.  Haven't wore contacts x 4 months ago(ran out) 2 week lens, but wants to   try daily lens.  No eye pain   No flashes or floaters.      Last edited by Ade King MA on 7/24/2019  8:11 AM. (History)            Assessment /Plan     For exam results, see Encounter Report.    Myopia, bilateral  -[facility]  Eyeglass Final Rx     Eyeglass Final Rx       Sphere Cylinder Axis Dist VA    Right -2.75 +0.75 165 20/20    Left -2.50 +0.75 165 20/20    Type:  SVL    Expiration Date:  7/24/2020              Contact Lens Current Rx     Current Contact Lens Rx       Brand Base Curve Diameter Sphere Cylinder Dist VA Centration Movement    Right Dailies Total 1 8.5 14.1 -2.00 Sphere 20/20 Well-centered 0.5 mm    Left Dailies Total 1 8.5 14.1 -2.00 Sphere 20/20 Well-centered 0.5 mm              Trial above, DW, 1 day    RTC 1 wk PHREV, 12 mo annual                 
16-Jul-2024 17:49

## 2024-07-17 ENCOUNTER — TELEPHONE (OUTPATIENT)
Dept: SPORTS MEDICINE | Facility: CLINIC | Age: 34
End: 2024-07-17
Payer: COMMERCIAL

## 2024-07-17 NOTE — TELEPHONE ENCOUNTER
Spoke to the patient in regard to 7/18 appt with Olesya Hartman PA-C. Informed the patient that her provider ordered x-ray. Patient informed that x-ray is scheduled at 8:15 am on 7/18. Patient verbally expressed she understood.

## 2024-07-18 ENCOUNTER — OFFICE VISIT (OUTPATIENT)
Dept: SPORTS MEDICINE | Facility: CLINIC | Age: 34
End: 2024-07-18
Payer: COMMERCIAL

## 2024-07-18 ENCOUNTER — HOSPITAL ENCOUNTER (OUTPATIENT)
Dept: RADIOLOGY | Facility: HOSPITAL | Age: 34
Discharge: HOME | End: 2024-07-18
Attending: PHYSICIAN ASSISTANT
Payer: COMMERCIAL

## 2024-07-18 VITALS
HEART RATE: 90 BPM | DIASTOLIC BLOOD PRESSURE: 76 MMHG | SYSTOLIC BLOOD PRESSURE: 126 MMHG | BODY MASS INDEX: 39.12 KG/M2 | WEIGHT: 234.81 LBS | HEIGHT: 65 IN

## 2024-07-18 DIAGNOSIS — M25.562 LEFT KNEE PAIN, UNSPECIFIED CHRONICITY: ICD-10-CM

## 2024-07-18 DIAGNOSIS — M22.2X2 PATELLOFEMORAL SYNDROME OF LEFT KNEE: ICD-10-CM

## 2024-07-18 DIAGNOSIS — M23.92 PATELLAR MALALIGNMENT SYNDROME, LEFT: Primary | ICD-10-CM

## 2024-07-18 DIAGNOSIS — M23.8X9 PATELLOFEMORAL CREPITUS: ICD-10-CM

## 2024-07-18 PROCEDURE — 3074F SYST BP LT 130 MM HG: CPT | Mod: CPTII,S$GLB,, | Performed by: PHYSICIAN ASSISTANT

## 2024-07-18 PROCEDURE — 99204 OFFICE O/P NEW MOD 45 MIN: CPT | Mod: S$GLB,,, | Performed by: PHYSICIAN ASSISTANT

## 2024-07-18 PROCEDURE — 73564 X-RAY EXAM KNEE 4 OR MORE: CPT | Mod: 26,50,, | Performed by: RADIOLOGY

## 2024-07-18 PROCEDURE — 99999 PR PBB SHADOW E&M-EST. PATIENT-LVL IV: CPT | Mod: PBBFAC,,, | Performed by: PHYSICIAN ASSISTANT

## 2024-07-18 PROCEDURE — 1160F RVW MEDS BY RX/DR IN RCRD: CPT | Mod: CPTII,S$GLB,, | Performed by: PHYSICIAN ASSISTANT

## 2024-07-18 PROCEDURE — 73564 X-RAY EXAM KNEE 4 OR MORE: CPT | Mod: TC,50,PN

## 2024-07-18 PROCEDURE — 3078F DIAST BP <80 MM HG: CPT | Mod: CPTII,S$GLB,, | Performed by: PHYSICIAN ASSISTANT

## 2024-07-18 PROCEDURE — 97110 THERAPEUTIC EXERCISES: CPT | Mod: GP,S$GLB,, | Performed by: PHYSICIAN ASSISTANT

## 2024-07-18 PROCEDURE — 1159F MED LIST DOCD IN RCRD: CPT | Mod: CPTII,S$GLB,, | Performed by: PHYSICIAN ASSISTANT

## 2024-07-18 PROCEDURE — 3008F BODY MASS INDEX DOCD: CPT | Mod: CPTII,S$GLB,, | Performed by: PHYSICIAN ASSISTANT
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
## 2024-07-23 ENCOUNTER — OFFICE VISIT (OUTPATIENT)
Dept: NEUROLOGY | Facility: CLINIC | Age: 34
End: 2024-07-23
Payer: COMMERCIAL

## 2024-07-23 DIAGNOSIS — G40.209 PARTIAL SYMPTOMATIC EPILEPSY WITH COMPLEX PARTIAL SEIZURES, NOT INTRACTABLE, WITHOUT STATUS EPILEPTICUS: Primary | ICD-10-CM

## 2024-07-23 DIAGNOSIS — F43.23 ADJUSTMENT DISORDER WITH MIXED ANXIETY AND DEPRESSED MOOD: ICD-10-CM

## 2024-07-23 DIAGNOSIS — F06.70 MILD NEUROCOGNITIVE DISORDER DUE TO ANOTHER MEDICAL CONDITION: ICD-10-CM

## 2024-07-23 PROCEDURE — 99499 UNLISTED E&M SERVICE: CPT | Mod: 95,,, | Performed by: STUDENT IN AN ORGANIZED HEALTH CARE EDUCATION/TRAINING PROGRAM

## 2024-07-23 NOTE — PROGRESS NOTES
Ms. Dietz attended a in-person feedback session to discuss the results from her recent neuropsychological testing (see report dated 07/03/2024). We discussed her test results, diagnoses, and my recommendations. Ms. Dietz voiced her understanding of the information provided, and voiced her intention to follow through on the recommendations provided. All questions were answered to her satisfaction and Ms. Dietz was provided with a copy of her report. she was encouraged to contact our office with any future questions or concerns.       Billing Documentation     Time on review of neuropsychological test data and relevant records, data interpretation, providing feedback about these results, and writing/ documentation: 60 minutes; billed separately.           _____________________  Ezra Swan, Ph.D.  Neuropsychologist  Department of Neuropsychology  [facility], [place]

## 2025-02-10 ENCOUNTER — TELEPHONE (OUTPATIENT)
Dept: PRIMARY CARE CLINIC | Facility: CLINIC | Age: 35
End: 2025-02-10
Payer: COMMERCIAL

## 2025-02-10 ENCOUNTER — PATIENT MESSAGE (OUTPATIENT)
Dept: PRIMARY CARE CLINIC | Facility: CLINIC | Age: 35
End: 2025-02-10
Payer: COMMERCIAL

## 2025-06-30 ENCOUNTER — TELEPHONE (OUTPATIENT)
Dept: INTERNAL MEDICINE | Facility: CLINIC | Age: 35
End: 2025-06-30
Payer: COMMERCIAL

## 2025-06-30 NOTE — TELEPHONE ENCOUNTER
Pt has appt on 7/2 to establish care. Would like to know if she can have staples removed at appt.   [place]

## 2025-06-30 NOTE — TELEPHONE ENCOUNTER
Please find out who she had brain surgery with, and call that clinic to make sure that she has had the staples in place for long enough.  If she has, I have no problems removing the staples.  Also, please try to get a copy of the records.

## 2025-06-30 NOTE — TELEPHONE ENCOUNTER
Copied from CRM #[id number]. Topic: General Inquiry - Patient Advice  >> Jun 30, 2025  1:26 PM [place] wrote:  .1MEDICALADVICE     Patient is calling for Medical Advice regarding: Pt called in regards to her upcoming [facility] for NP she is asking can she has some staples removed at her [facility] pt [place]th  please call and advise.    How long has patient had these symptoms:N/A    Pharmacy name and phone#:[address] [address]  Phone: [phone] Fax: [phone]        Patient wants a call back or thru [facility], provide patient's call back phone number:Callback     Comments:    Please advise patient replies from provider may take up to 48 hours.

## 2025-06-30 NOTE — TELEPHONE ENCOUNTER
Click on care everywhere to request updates.  Updates now available in chart.  Bring D/c summary in case.

## 2025-06-30 NOTE — TELEPHONE ENCOUNTER
Spoke to patient she has a copy of the discharge summery and she will try and merge her records to her MyChart .

## 2025-07-02 ENCOUNTER — OFFICE VISIT (OUTPATIENT)
Dept: INTERNAL MEDICINE | Facility: CLINIC | Age: 35
End: 2025-07-02
Payer: COMMERCIAL

## 2025-07-02 VITALS
BODY MASS INDEX: 40.4 KG/M2 | WEIGHT: 242.5 LBS | OXYGEN SATURATION: 98 % | HEART RATE: 105 BPM | SYSTOLIC BLOOD PRESSURE: 118 MMHG | DIASTOLIC BLOOD PRESSURE: 86 MMHG | HEIGHT: 65 IN | RESPIRATION RATE: 14 BRPM | TEMPERATURE: 99 F

## 2025-07-02 DIAGNOSIS — Z00.00 ANNUAL PHYSICAL EXAM: Primary | ICD-10-CM

## 2025-07-02 DIAGNOSIS — F31.81 BIPOLAR 2 DISORDER: ICD-10-CM

## 2025-07-02 DIAGNOSIS — R56.9 SEIZURE: Chronic | ICD-10-CM

## 2025-07-02 DIAGNOSIS — E66.09 CLASS 2 OBESITY DUE TO EXCESS CALORIES WITHOUT SERIOUS COMORBIDITY WITH BODY MASS INDEX (BMI) OF 36.0 TO 36.9 IN ADULT: ICD-10-CM

## 2025-07-02 DIAGNOSIS — E66.812 CLASS 2 OBESITY DUE TO EXCESS CALORIES WITHOUT SERIOUS COMORBIDITY WITH BODY MASS INDEX (BMI) OF 36.0 TO 36.9 IN ADULT: ICD-10-CM

## 2025-07-02 PROCEDURE — 99999 PR PBB SHADOW E&M-EST. PATIENT-LVL IV: CPT | Mod: PBBFAC,,, | Performed by: INTERNAL MEDICINE

## 2025-07-02 NOTE — PROGRESS NOTES
Assessment:       1. Annual physical exam  - Ambulatory referral/consult to Obstetrics / Gynecology; Future  - CBC Auto Differential; Future  - Comprehensive Metabolic Panel; Future  - TSH; Future  - Lipid Panel; Future    2. Bipolar 2 disorder    3. Class 2 obesity due to excess calories without serious comorbidity with body mass index (BMI) of 36.0 to 36.9 in adult    4. Seizure        Plan:       1. Check CBC, CMP, TSH, lipids.  Discussed diet and exercise.  Discussed vaccines.    2. Continue Lamictal 150 mg 2 tablets twice daily, Seroquel 100 mg, Risperdal 0.25 mg per Psychiatry.    3. Discussed diet and exercise.  4. Followed by Neurology at [facility].  Had transcranial EEG done.  Removed 17 kianna.    Deep Scribe:  IMPRESSION:  1. Conducted annual exam for 35-year-old female.  2. Reviewed history of bipolar disorder and current psychiatric medications.  3. Assessed family history of breast cancer, noting mother's diagnosis in early 50s.  4. Considered recent craniotomy for intracranial EEG and ongoing epilepsy management.  5. Evaluated current health status, including weight gain and exercise limitations due to seizures.  6. Removed 17 staples from recent craniotomy incision.    SUMMARY:   Improve diet and increase physical activity to address weight gain   Start walking program, aiming for 30 minutes, 5 days a week   Ordered CBC, CMP, TSH, and lipid panel   Referred to gynecologist for ongoing care and breast exams   Contact insurance company regarding coverage for weight management programs   Follow up for weight management program order if required by insurance   Contact office if any missed staples are found for immediate removal    ANNUAL PHYSICAL EXAM:   Explained importance of regular GYN check-ups even after hysterectomy.   Discussed need for mammogram screening starting at age 40.   Referred to gynecologist for ongoing care and breast exams.   Advised on proper hair washing technique post-staple  removal.   Contact the office if any missed staples are found for immediate removal.   Ordered CBC, CMP, TSH, and lipid panel.    CLASS 2 OBESITY DUE TO EXCESS CALORIES WITHOUT SERIOUS COMORBIDITY WITH BODY MASS INDEX (BMI) OF 36.0 TO 36.9 IN ADULT:   Ms. Dietz to improve diet and increase physical activity to address weight gain and overall health.   Ms. Dietz to contact insurance company regarding coverage for weight management programs and obtain written confirmation.   Follow up to write order for weight management program if required by insurance.   Ms. Dietz to start walking program, beginning with short distances close to home in shaded walking path in nearby park to mitigate heat sensitivity, aiming for 30 minutes, 5 days a week.                 This note was generated with the assistance of ambient listening technology. Verbal consent was obtained by the patient and accompanying visitor(s) for the recording of patient appointment to facilitate this note. I attest to having reviewed and edited the generated note for accuracy, though some syntax or spelling errors may persist. Please contact the author of this note for any clarification.       Subjective:       Patient ID: Shaina Dietz is a 35 y.o. female.    Chief Complaint: Suture / Staple Removal and Annual Exam    HPI    35 y.o. female here for annual exam.     Cholesterol: needs  Vaccines: Influenza - 2024; Tetanus - 2023; COVID - 3 done  Sexual Screening: active  STD screening: no concern  Eye exam: done recently (due)  Mammogram: mom (early 50s) and paternal grandmother (unsure of age)  Gyn exam: needs to get one.  Colonoscopy: No family history of cancer    Exercise: no regular exercise.  Diet: has not been eating well.  Trying to work on this.    History of Present Illness    CHIEF COMPLAINT:  Ms. Dietz presents today for annual exam.    NEUROLOGICAL HISTORY:  She has epilepsy with multiple seizures during recent hospitalization from 9th to  19th for intracranial EEG and craniotomy. She is currently on multiple seizure medications and considering surgical options for epilepsy management. Her neurologist is located at [facility].    PSYCHIATRIC HISTORY:  She has bipolar disorder type 2, managed with Lamictal 150mg twice daily, Seroquel 100mg, and Risperdal 0.25mg as recommended by psychiatry.    DIET AND EXERCISE:  She reports poor dietary habits, primarily consuming fast food and eating out. She is not currently exercising due to recent seizures. She acknowledges weight gain since epilepsy diagnosis. She expresses motivation to improve diet and exercise in preparation for upcoming surgery and to address high heart rate. She experiences significant heat sensitivity with excessive sweating and difficulty tolerating high temperatures, which limits physical activity.    SURGICAL HISTORY:  She has history of hysterectomy with cervix removed and ovaries retained.    FAMILY HISTORY:  Her mother was diagnosed with breast cancer in early 50s. Her paternal grandmother had cervical cancer status post surgical intervention.    SOCIAL HISTORY:  She denies alcohol, tobacco, and drug use. She is sexually active without STD concerns. She is unable to drive due to frequent recent seizures.    PREVENTIVE CARE:  She received flu vaccine last season and is due for eye exam.      ROS:  Constitutional: +weight gain, +change in diet  Cardiovascular: +feelings of fast heart rate  Neurological: +seizures  Endocrine: +excessive sweating, +heat intolerance         Review of Systems          Objective:      Physical Exam  Vitals reviewed.   Constitutional:       Appearance: She is well-developed.   HENT:      Head: Normocephalic and atraumatic.      Mouth/Throat:      Pharynx: No oropharyngeal exudate.   Eyes:      General: No scleral icterus.        Right eye: No discharge.         Left eye: No discharge.      Pupils: Pupils are equal, round, and reactive to light.    Neck:      Thyroid: No thyromegaly.      Trachea: No tracheal deviation.   Cardiovascular:      Rate and Rhythm: Normal rate and regular rhythm.      Heart sounds: Normal heart sounds. No murmur heard.     No friction rub. No gallop.   Pulmonary:      Effort: Pulmonary effort is normal. No respiratory distress.      Breath sounds: Normal breath sounds. No wheezing or rales.   Chest:      Chest wall: No tenderness.   Abdominal:      General: Bowel sounds are normal. There is no distension.      Palpations: Abdomen is soft. There is no mass.      Tenderness: There is no abdominal tenderness. There is no guarding or rebound.   Musculoskeletal:         General: No tenderness. Normal range of motion.      Cervical back: Normal range of motion and neck supple.   Skin:     General: Skin is warm and dry.      Coloration: Skin is not pale.      Findings: No erythema or rash.   Neurological:      Mental Status: She is alert and oriented to person, place, and time.   Psychiatric:         Behavior: Behavior normal.

## 2025-08-19 ENCOUNTER — PATIENT MESSAGE (OUTPATIENT)
Dept: NEUROLOGY | Facility: CLINIC | Age: 35
End: 2025-08-19
Payer: COMMERCIAL

## 2025-08-22 DIAGNOSIS — F06.70 MILD NEUROCOGNITIVE DISORDER DUE TO ANOTHER MEDICAL CONDITION: ICD-10-CM

## 2025-08-22 DIAGNOSIS — G40.209 PARTIAL SYMPTOMATIC EPILEPSY WITH COMPLEX PARTIAL SEIZURES, NOT INTRACTABLE, WITHOUT STATUS EPILEPTICUS: Primary | ICD-10-CM

## 2025-08-22 DIAGNOSIS — F43.23 ADJUSTMENT DISORDER WITH MIXED ANXIETY AND DEPRESSED MOOD: ICD-10-CM

## 2025-09-05 ENCOUNTER — TELEPHONE (OUTPATIENT)
Facility: CLINIC | Age: 35
End: 2025-09-05
Payer: COMMERCIAL